# Patient Record
Sex: MALE | Race: WHITE
[De-identification: names, ages, dates, MRNs, and addresses within clinical notes are randomized per-mention and may not be internally consistent; named-entity substitution may affect disease eponyms.]

---

## 2020-03-23 ENCOUNTER — TRANSCRIPTION ENCOUNTER (OUTPATIENT)
Age: 30
End: 2020-03-23

## 2020-07-06 PROBLEM — Z00.00 ENCOUNTER FOR PREVENTIVE HEALTH EXAMINATION: Status: ACTIVE | Noted: 2020-07-06

## 2020-07-14 ENCOUNTER — APPOINTMENT (OUTPATIENT)
Dept: ORTHOPEDIC SURGERY | Facility: CLINIC | Age: 30
End: 2020-07-14
Payer: COMMERCIAL

## 2020-07-14 VITALS — TEMPERATURE: 98.1 F

## 2020-07-14 VITALS
BODY MASS INDEX: 24.38 KG/M2 | DIASTOLIC BLOOD PRESSURE: 73 MMHG | HEART RATE: 89 BPM | WEIGHT: 190 LBS | HEIGHT: 74 IN | SYSTOLIC BLOOD PRESSURE: 137 MMHG

## 2020-07-14 DIAGNOSIS — Z78.9 OTHER SPECIFIED HEALTH STATUS: ICD-10-CM

## 2020-07-14 PROCEDURE — 72070 X-RAY EXAM THORAC SPINE 2VWS: CPT

## 2020-07-14 PROCEDURE — 99204 OFFICE O/P NEW MOD 45 MIN: CPT

## 2020-07-14 NOTE — PHYSICAL EXAM
[Normal] : Alert and in no acute distress [Poor Appearance] : well-appearing [Acute Distress] : not in acute distress [Obese] : not obese [de-identified] : CONSTITUTIONAL: The patient is a very pleasant individual who is well-nourished and who appears stated age. 2 cm head laceration with staples noted. Staples were removed in office today. Wound is C/D/I. \par PSYCHIATRIC: The patient is alert and oriented X 3 and in no apparent distress, and participates with orthopedic evaluation well.\par HEAD: Atraumatic and is nonsyndromic in appearance.\par EENT: No visible thyromegaly, EOMI.\par RESPIRATORY: Respiratory rate is regular, not dyspneic on examination.\par LYMPHATICS: There is no inguinal lymphadenopathy\par INTEGUMENTARY: Skin is clean, dry, and intact about the bilateral lower extremities and lumbar spine.\par VASCULAR: There is brisk capillary refill about the bilateral lower extremities.\par NEUROLOGIC: There are no pathologic reflexes. There is no decrease in sensation of the bilateral lower extremities on Wartenberg pinwheel examination. Deep tendon reflexes are well maintained at 2+/4 of the bilateral lower extremities and are symmetric..\par MUSCULOSKELETAL: There is no visible muscular atrophy. Manual motor strength is well maintained in the bilateral lower extremities. Range of motion of lumbar spine is well maintained. The patient ambulates in a non-myelopathic manner. Negative tension sign and straight leg raise bilaterally. Quad extension, ankle dorsiflexion, EHL, plantar flexion, and ankle eversion are well preserved. Normal secondary orthopaedic exam of bilateral hips, greater trochanteric area, knees and ankles \par \par  [de-identified] : cervical MRI done at [facility] 7/1/2020: multilevel DDD greatest at C5-C6 with moderate spinal stenosis, C5-C6 minimal right disc protrusion, C4-C5 foraminal disc protrusion on the left. \par \par thoracic CT done at [facility] 7/1/2020: stable T11 compression fracture\par \par cervical CT done at [facility] 7/1/2020: no fracture. \par \par thoracic and lumbar MRI done at [facility] 7/1/2020: mild superior T10 and T11 endplate fractures, diffuse degenerative disc disease of the lumbar spine, no cord signal abnormality. \par \par

## 2020-07-14 NOTE — ADDENDUM
[FreeTextEntry1] : Documented by Paige Root acting as a scribe for LETITIA Andrew. on 07/14/2020. \par \par All medical record entries made by the Scribe were at my, LETITIA Andrew, direction and personally dictated by me on 07/14/2020. I have reviewed the chart and agree that the record accurately reflects my personal performance of the history, physical exam, assessment and plan. I have also personally directed, reviewed, and agreed with the chart.

## 2020-07-14 NOTE — HISTORY OF PRESENT ILLNESS
[de-identified] : 29 year old male presents for evaluation of mid back pain follow a fall from a ladder on 7/1/2020. Patient was on an extension ladder when the tarp underneath it slipped out, causing the ladder to fall. Patient states he fell with the ladder and landed directly on his back. Patient states he also suffered a laceration to his head with this fall. Patient went to [facility] where advanced imaging was done of his head as well as of his cervical, thoracic, and lumbar spine. This imaging revealed stable T10 and T11 fractures with no cord signal change. He was discharged with Percocet and Robaxin, and placed in a cervical and lumbar stabilizing brace. He has since been ambulating with a cane for stability. He locates pain directly over T10 and T11, and radiating muscular pain around his bilateral sides. He describes an increase in pain with twisting but denies weakness, numbness, or tingling. He has been using minimal pain medication.  [Incontinence] : no incontinence [Ataxia] : no ataxia [Urinary Ret.] : no urinary retention [Loss of Dexterity] : good dexterity

## 2020-07-14 NOTE — DISCUSSION/SUMMARY
[de-identified] : We talked about the nature of the condition and treatment options. Patient has a stable T10 and T11 compression fractures following his fall. I reviewed x-ray films with them. At this time I have recommended rest and observation. He was recommended to avoid any lifting or repetitive bending activities. He may take ibuprofen and Tylenol for pain, as well as methocarbamol. He may discontinue use of the cervical and lumbar brace to his comfort level. 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 He may follow up in 4 weeks for repeat clinical evaluation at which point we will discuss full time return to work. \par \par He did have a head laceration, and his staples were removed today in office.

## 2020-08-10 DIAGNOSIS — M47.812 SPONDYLOSIS W/OUT MYELOPATHY OR RADICULOPATHY, CERVICAL REGION: ICD-10-CM

## 2020-08-11 ENCOUNTER — APPOINTMENT (OUTPATIENT)
Dept: ORTHOPEDIC SURGERY | Facility: CLINIC | Age: 30
End: 2020-08-11
Payer: COMMERCIAL

## 2020-08-11 VITALS
BODY MASS INDEX: 24.38 KG/M2 | DIASTOLIC BLOOD PRESSURE: 65 MMHG | HEIGHT: 74 IN | SYSTOLIC BLOOD PRESSURE: 123 MMHG | HEART RATE: 69 BPM | WEIGHT: 190 LBS

## 2020-08-11 VITALS — TEMPERATURE: 97.3 F

## 2020-08-11 DIAGNOSIS — S22.070A WEDGE COMPRESSION FRACTURE OF T9-T10 VERTEBRA, INITIAL ENCOUNTER FOR CLOSED FRACTURE: ICD-10-CM

## 2020-08-11 DIAGNOSIS — S22.080A WEDGE COMPRESSION FRACTURE OF T11-T12 VERTEBRA, INITIAL ENCOUNTER FOR CLOSED FRACTURE: ICD-10-CM

## 2020-08-11 PROCEDURE — 72040 X-RAY EXAM NECK SPINE 2-3 VW: CPT

## 2020-08-11 PROCEDURE — 72070 X-RAY EXAM THORAC SPINE 2VWS: CPT

## 2020-08-11 PROCEDURE — 99214 OFFICE O/P EST MOD 30 MIN: CPT

## 2020-08-11 NOTE — PHYSICAL EXAM
[de-identified] : CONSTITUTIONAL: Patient is a very pleasant individual who is well-nourished and appears stated age. \par PSYCHIATRIC: Alert and oriented times three and in no apparent distress, and participates with orthopedic evaluation well.\par HEAD: Atraumatic and nonsyndromic in appearance.\par EENT: No thyromegaly, EOMI.\par RESPIRATORY: Respiratory rate is regular, not dyspneic on examination.\par LYMPHATICS: There is no cervical or axillary lymphadenopathy.\par INTEGUMENTARY: Skin is clean, dry, and intact about the bilateral upper extremities and cervical spine. \par VASCULAR: There is brisk capillary refill about the bilateral upper extremities and radial pulses are 2/4. \par NEUROLOGIC: Negative L'hirmitte, negative Spurling’s sign. There are no pathologic reflexes. There is no decrease in sensation of the bilateral upper extremities on Wartenberg pinwheel examination. Deep tendon reflexes are well-maintained at +2/4 of the bilateral upper extremities and are symmetric.\par MUSCULOSKELETAL: There is no visible muscular atrophy. Manual motor strength is well maintained in the bilateral upper extremities. Cervical range of motion is well maintained. The patient ambulates in a non-myelopathic manner. Normal secondary orthopaedic exam of bilateral shoulders, elbows and hands. Elbow flexion and extension, wrist extension, finger flexion and abduction are well maintained. \par Mild mechanically oriented thoracic back pain.  [de-identified] : Xray of the thoracic spine taken today shows compression fracture at T10 and T11 and loss thoracic kyphosis. \par \par Xray of the cervical spine taken today shows lose of cervical lordosis.

## 2020-08-11 NOTE — ADDENDUM
[FreeTextEntry1] : Documented by Herman Lagos acting as a scribe for Dr. Gil Hansen on 08/11/2020. All medical record entries made by the Scribe were at my, Dr. Gil Hansen, direction and personally dictated by me on 08/11/2020 . I have reviewed the chart and agree that the record accurately reflects my personal performance of the history, physical exam, assessment and plan. I have also personally directed, reviewed, and agreed with the chart.

## 2020-08-11 NOTE — REVIEW OF SYSTEMS
[Joint Pain] : joint pain [Negative] : Heme/Lymph [Fever] : no fever [SOB on Exertion] : no shortness of breath on exertion [Cough] : no cough [Chills] : no chills

## 2020-08-11 NOTE — DISCUSSION/SUMMARY
[de-identified] : Patient to follow up in two months to monitor his thoracic compression fractures.

## 2020-08-11 NOTE — HISTORY OF PRESENT ILLNESS
[Ataxia] : no ataxia [de-identified] : 29 year M Presents with compression fractures in T10 and T11 sustained on july 1st 2020. Pt gradually improving and is slowly returning to exercise and working.  [Incontinence] : no incontinence [Loss of Dexterity] : good dexterity [Urinary Ret.] : no urinary retention

## 2020-10-13 ENCOUNTER — APPOINTMENT (OUTPATIENT)
Dept: ORTHOPEDIC SURGERY | Facility: CLINIC | Age: 30
End: 2020-10-13

## 2020-11-09 ENCOUNTER — TRANSCRIPTION ENCOUNTER (OUTPATIENT)
Age: 30
End: 2020-11-09

## 2020-11-16 ENCOUNTER — TRANSCRIPTION ENCOUNTER (OUTPATIENT)
Age: 30
End: 2020-11-16

## 2022-01-24 ENCOUNTER — TRANSCRIPTION ENCOUNTER (OUTPATIENT)
Age: 32
End: 2022-01-24

## 2022-02-09 ENCOUNTER — TRANSCRIPTION ENCOUNTER (OUTPATIENT)
Age: 32
End: 2022-02-09

## 2023-09-24 ENCOUNTER — NON-APPOINTMENT (OUTPATIENT)
Age: 33
End: 2023-09-24

## 2025-06-14 ENCOUNTER — INPATIENT (INPATIENT)
Facility: HOSPITAL | Age: 35
LOS: 1 days | Discharge: HOME | DRG: 914 | End: 2025-06-16
Attending: STUDENT IN AN ORGANIZED HEALTH CARE EDUCATION/TRAINING PROGRAM | Admitting: STUDENT IN AN ORGANIZED HEALTH CARE EDUCATION/TRAINING PROGRAM
Payer: SELF-PAY

## 2025-06-14 VITALS — HEART RATE: 46 BPM | DIASTOLIC BLOOD PRESSURE: 100 MMHG | SYSTOLIC BLOOD PRESSURE: 146 MMHG

## 2025-06-14 DIAGNOSIS — S09.90XA UNSPECIFIED INJURY OF HEAD, INITIAL ENCOUNTER: ICD-10-CM

## 2025-06-14 LAB
ABO RH CONFIRMATION: SIGNIFICANT CHANGE UP
ALBUMIN SERPL ELPH-MCNC: 4.1 G/DL — SIGNIFICANT CHANGE UP (ref 3.3–5.2)
ALP SERPL-CCNC: 35 U/L — LOW (ref 40–120)
ALT FLD-CCNC: 18 U/L — SIGNIFICANT CHANGE UP
AMPHET UR-MCNC: NEGATIVE — SIGNIFICANT CHANGE UP
ANION GAP SERPL CALC-SCNC: 12 MMOL/L — SIGNIFICANT CHANGE UP (ref 5–17)
APPEARANCE UR: CLEAR — SIGNIFICANT CHANGE UP
APTT BLD: 24.9 SEC — LOW (ref 26.1–36.8)
AST SERPL-CCNC: 28 U/L — SIGNIFICANT CHANGE UP
BACTERIA # UR AUTO: NEGATIVE /HPF — SIGNIFICANT CHANGE UP
BARBITURATES UR SCN-MCNC: NEGATIVE — SIGNIFICANT CHANGE UP
BASOPHILS # BLD AUTO: 0.04 K/UL — SIGNIFICANT CHANGE UP (ref 0–0.2)
BASOPHILS NFR BLD AUTO: 0.2 % — SIGNIFICANT CHANGE UP (ref 0–2)
BENZODIAZ UR-MCNC: NEGATIVE — SIGNIFICANT CHANGE UP
BILIRUB SERPL-MCNC: 0.3 MG/DL — LOW (ref 0.4–2)
BILIRUB UR-MCNC: NEGATIVE — SIGNIFICANT CHANGE UP
BLD GP AB SCN SERPL QL: SIGNIFICANT CHANGE UP
BUN SERPL-MCNC: 17.4 MG/DL — SIGNIFICANT CHANGE UP (ref 8–20)
CALCIUM SERPL-MCNC: 8.5 MG/DL — SIGNIFICANT CHANGE UP (ref 8.4–10.5)
CAST: 7 /LPF — HIGH (ref 0–4)
CHLORIDE SERPL-SCNC: 105 MMOL/L — SIGNIFICANT CHANGE UP (ref 96–108)
CK MB CFR SERPL CALC: 5.8 NG/ML — SIGNIFICANT CHANGE UP (ref 0–6.7)
CK SERPL-CCNC: 339 U/L — HIGH (ref 30–200)
CO2 SERPL-SCNC: 21 MMOL/L — LOW (ref 22–29)
COCAINE METAB.OTHER UR-MCNC: POSITIVE
COLOR SPEC: YELLOW — SIGNIFICANT CHANGE UP
CREAT SERPL-MCNC: 0.74 MG/DL — SIGNIFICANT CHANGE UP (ref 0.5–1.3)
DIFF PNL FLD: ABNORMAL
EGFR: 117 ML/MIN/1.73M2 — SIGNIFICANT CHANGE UP
EGFR: 117 ML/MIN/1.73M2 — SIGNIFICANT CHANGE UP
EOSINOPHIL # BLD AUTO: 0.04 K/UL — SIGNIFICANT CHANGE UP (ref 0–0.5)
EOSINOPHIL NFR BLD AUTO: 0.2 % — SIGNIFICANT CHANGE UP (ref 0–6)
ETHANOL SERPL-MCNC: <10 MG/DL — SIGNIFICANT CHANGE UP (ref 0–9)
FENTANYL UR QL SCN: NEGATIVE — SIGNIFICANT CHANGE UP
GAS PNL BLDA: SIGNIFICANT CHANGE UP
GLUCOSE SERPL-MCNC: 142 MG/DL — HIGH (ref 70–99)
GLUCOSE UR QL: NEGATIVE MG/DL — SIGNIFICANT CHANGE UP
HCT VFR BLD CALC: 39.6 % — SIGNIFICANT CHANGE UP (ref 39–50)
HGB BLD-MCNC: 12.9 G/DL — LOW (ref 13–17)
IMM GRANULOCYTES # BLD AUTO: 0.14 K/UL — HIGH (ref 0–0.07)
IMM GRANULOCYTES NFR BLD AUTO: 0.7 % — SIGNIFICANT CHANGE UP (ref 0–0.9)
INR BLD: 1.2 RATIO — HIGH (ref 0.85–1.16)
KETONES UR QL: ABNORMAL MG/DL
LEUKOCYTE ESTERASE UR-ACNC: NEGATIVE — SIGNIFICANT CHANGE UP
LIDOCAIN IGE QN: 24 U/L — SIGNIFICANT CHANGE UP (ref 22–51)
LYMPHOCYTES # BLD AUTO: 1.17 K/UL — SIGNIFICANT CHANGE UP (ref 1–3.3)
LYMPHOCYTES NFR BLD AUTO: 6.2 % — LOW (ref 13–44)
MCHC RBC-ENTMCNC: 29.5 PG — SIGNIFICANT CHANGE UP (ref 27–34)
MCHC RBC-ENTMCNC: 32.6 G/DL — SIGNIFICANT CHANGE UP (ref 32–36)
MCV RBC AUTO: 90.4 FL — SIGNIFICANT CHANGE UP (ref 80–100)
METHADONE UR-MCNC: NEGATIVE — SIGNIFICANT CHANGE UP
MONOCYTES # BLD AUTO: 1.08 K/UL — HIGH (ref 0–0.9)
MONOCYTES NFR BLD AUTO: 5.7 % — SIGNIFICANT CHANGE UP (ref 2–14)
MRSA PCR RESULT.: SIGNIFICANT CHANGE UP
NEUTROPHILS # BLD AUTO: 16.53 K/UL — HIGH (ref 1.8–7.4)
NEUTROPHILS NFR BLD AUTO: 87 % — HIGH (ref 43–77)
NITRITE UR-MCNC: NEGATIVE — SIGNIFICANT CHANGE UP
NRBC # BLD AUTO: 0 K/UL — SIGNIFICANT CHANGE UP (ref 0–0)
NRBC # FLD: 0 K/UL — SIGNIFICANT CHANGE UP (ref 0–0)
NRBC BLD AUTO-RTO: 0 /100 WBCS — SIGNIFICANT CHANGE UP (ref 0–0)
OPIATES UR-MCNC: NEGATIVE — SIGNIFICANT CHANGE UP
PCP SPEC-MCNC: SIGNIFICANT CHANGE UP
PCP UR-MCNC: NEGATIVE — SIGNIFICANT CHANGE UP
PH UR: 5.5 — SIGNIFICANT CHANGE UP (ref 5–8)
PLATELET # BLD AUTO: 192 K/UL — SIGNIFICANT CHANGE UP (ref 150–400)
PMV BLD: 10.7 FL — SIGNIFICANT CHANGE UP (ref 7–13)
POTASSIUM SERPL-MCNC: 3.5 MMOL/L — SIGNIFICANT CHANGE UP (ref 3.5–5.3)
POTASSIUM SERPL-SCNC: 3.5 MMOL/L — SIGNIFICANT CHANGE UP (ref 3.5–5.3)
PROT SERPL-MCNC: 6.2 G/DL — LOW (ref 6.6–8.7)
PROT UR-MCNC: 30 MG/DL
PROTHROM AB SERPL-ACNC: 13.5 SEC — HIGH (ref 9.9–13.4)
RBC # BLD: 4.38 M/UL — SIGNIFICANT CHANGE UP (ref 4.2–5.8)
RBC # FLD: 12.2 % — SIGNIFICANT CHANGE UP (ref 10.3–14.5)
RBC CASTS # UR COMP ASSIST: 0 /HPF — SIGNIFICANT CHANGE UP (ref 0–4)
S AUREUS DNA NOSE QL NAA+PROBE: SIGNIFICANT CHANGE UP
SODIUM SERPL-SCNC: 138 MMOL/L — SIGNIFICANT CHANGE UP (ref 135–145)
SP GR SPEC: >1.03 — HIGH (ref 1–1.03)
SQUAMOUS # UR AUTO: 1 /HPF — SIGNIFICANT CHANGE UP (ref 0–5)
THC UR QL: POSITIVE
TROPONIN T, HIGH SENSITIVITY RESULT: <6 NG/L — SIGNIFICANT CHANGE UP (ref 0–51)
UROBILINOGEN FLD QL: 1 MG/DL — SIGNIFICANT CHANGE UP (ref 0.2–1)
WBC # BLD: 19 K/UL — HIGH (ref 3.8–10.5)
WBC # FLD AUTO: 19 K/UL — HIGH (ref 3.8–10.5)
WBC UR QL: 3 /HPF — SIGNIFICANT CHANGE UP (ref 0–5)

## 2025-06-14 RX ORDER — LEVETIRACETAM 10 MG/ML
500 INJECTION, SOLUTION INTRAVENOUS EVERY 12 HOURS
Refills: 0 | Status: DISCONTINUED | OUTPATIENT
Start: 2025-06-14 | End: 2025-06-15

## 2025-06-14 RX ORDER — ONDANSETRON HCL/PF 4 MG/2 ML
4 VIAL (ML) INJECTION ONCE
Refills: 0 | Status: COMPLETED | OUTPATIENT
Start: 2025-06-14 | End: 2025-06-14

## 2025-06-14 RX ORDER — CEFAZOLIN SODIUM IN 0.9 % NACL 3 G/100 ML
2000 INTRAVENOUS SOLUTION, PIGGYBACK (ML) INTRAVENOUS ONCE
Refills: 0 | Status: COMPLETED | OUTPATIENT
Start: 2025-06-14 | End: 2025-06-14

## 2025-06-14 RX ORDER — MIDAZOLAM IN 0.9 % SOD.CHLORID 1 MG/ML
4 PLASTIC BAG, INJECTION (ML) INTRAVENOUS ONCE
Refills: 0 | Status: DISCONTINUED | OUTPATIENT
Start: 2025-06-14 | End: 2025-06-14

## 2025-06-14 RX ORDER — OXYCODONE HYDROCHLORIDE 30 MG/1
5 TABLET ORAL EVERY 4 HOURS
Refills: 0 | Status: DISCONTINUED | OUTPATIENT
Start: 2025-06-14 | End: 2025-06-15

## 2025-06-14 RX ORDER — CEFAZOLIN SODIUM IN 0.9 % NACL 3 G/100 ML
2000 INTRAVENOUS SOLUTION, PIGGYBACK (ML) INTRAVENOUS ONCE
Refills: 0 | Status: DISCONTINUED | OUTPATIENT
Start: 2025-06-14 | End: 2025-06-14

## 2025-06-14 RX ORDER — HYDROMORPHONE/SOD CHLOR,ISO/PF 2 MG/10 ML
0.5 SYRINGE (ML) INJECTION EVERY 4 HOURS
Refills: 0 | Status: DISCONTINUED | OUTPATIENT
Start: 2025-06-14 | End: 2025-06-15

## 2025-06-14 RX ORDER — OXYCODONE HYDROCHLORIDE 30 MG/1
10 TABLET ORAL EVERY 4 HOURS
Refills: 0 | Status: DISCONTINUED | OUTPATIENT
Start: 2025-06-14 | End: 2025-06-15

## 2025-06-14 RX ORDER — ACETAMINOPHEN 500 MG/5ML
1000 LIQUID (ML) ORAL EVERY 6 HOURS
Refills: 0 | Status: DISCONTINUED | OUTPATIENT
Start: 2025-06-14 | End: 2025-06-14

## 2025-06-14 RX ORDER — SENNA 187 MG
2 TABLET ORAL AT BEDTIME
Refills: 0 | Status: DISCONTINUED | OUTPATIENT
Start: 2025-06-14 | End: 2025-06-16

## 2025-06-14 RX ORDER — POLYETHYLENE GLYCOL 3350 17 G/17G
17 POWDER, FOR SOLUTION ORAL DAILY
Refills: 0 | Status: DISCONTINUED | OUTPATIENT
Start: 2025-06-14 | End: 2025-06-16

## 2025-06-14 RX ORDER — DEXMEDETOMIDINE HYDROCHLORIDE IN SODIUM CHLORIDE 4 UG/ML
0.5 INJECTION INTRAVENOUS
Qty: 400 | Refills: 0 | Status: DISCONTINUED | OUTPATIENT
Start: 2025-06-14 | End: 2025-06-14

## 2025-06-14 RX ORDER — SODIUM CHLORIDE 9 G/1000ML
1000 INJECTION, SOLUTION INTRAVENOUS
Refills: 0 | Status: DISCONTINUED | OUTPATIENT
Start: 2025-06-14 | End: 2025-06-15

## 2025-06-14 RX ORDER — BACITRACIN 500 UNIT/G
1 OINTMENT (GRAM) TOPICAL DAILY
Refills: 0 | Status: DISCONTINUED | OUTPATIENT
Start: 2025-06-14 | End: 2025-06-16

## 2025-06-14 RX ORDER — FENTANYL CITRATE-0.9 % NACL/PF 100MCG/2ML
1 SYRINGE (ML) INTRAVENOUS
Qty: 2500 | Refills: 0 | Status: DISCONTINUED | OUTPATIENT
Start: 2025-06-14 | End: 2025-06-14

## 2025-06-14 RX ORDER — SODIUM CHLORIDE 9 G/1000ML
1000 INJECTION, SOLUTION INTRAVENOUS ONCE
Refills: 0 | Status: COMPLETED | OUTPATIENT
Start: 2025-06-14 | End: 2025-06-14

## 2025-06-14 RX ORDER — PROPOFOL 10 MG/ML
20 INJECTION, EMULSION INTRAVENOUS
Qty: 1000 | Refills: 0 | Status: DISCONTINUED | OUTPATIENT
Start: 2025-06-14 | End: 2025-06-14

## 2025-06-14 RX ORDER — ACETAMINOPHEN 500 MG/5ML
1000 LIQUID (ML) ORAL EVERY 6 HOURS
Refills: 0 | Status: DISCONTINUED | OUTPATIENT
Start: 2025-06-14 | End: 2025-06-15

## 2025-06-14 RX ORDER — FENTANYL CITRATE-0.9 % NACL/PF 100MCG/2ML
50 SYRINGE (ML) INTRAVENOUS ONCE
Refills: 0 | Status: DISCONTINUED | OUTPATIENT
Start: 2025-06-14 | End: 2025-06-14

## 2025-06-14 RX ORDER — DIAZEPAM 5 MG/1
5 TABLET ORAL ONCE
Refills: 0 | Status: DISCONTINUED | OUTPATIENT
Start: 2025-06-14 | End: 2025-06-14

## 2025-06-14 RX ADMIN — PROPOFOL 9 MICROGRAM(S)/KG/MIN: 10 INJECTION, EMULSION INTRAVENOUS at 08:05

## 2025-06-14 RX ADMIN — Medication 2000 MILLIGRAM(S): at 11:39

## 2025-06-14 RX ADMIN — DIAZEPAM 5 MILLIGRAM(S): 5 TABLET ORAL at 21:37

## 2025-06-14 RX ADMIN — Medication 2000 MILLIGRAM(S): at 06:50

## 2025-06-14 RX ADMIN — Medication 4 MILLIGRAM(S): at 21:36

## 2025-06-14 RX ADMIN — Medication 50 MICROGRAM(S): at 08:05

## 2025-06-14 RX ADMIN — PROPOFOL 9 MICROGRAM(S)/KG/MIN: 10 INJECTION, EMULSION INTRAVENOUS at 16:16

## 2025-06-14 RX ADMIN — Medication 1000 MILLIGRAM(S): at 22:31

## 2025-06-14 RX ADMIN — PROPOFOL 9 MICROGRAM(S)/KG/MIN: 10 INJECTION, EMULSION INTRAVENOUS at 10:00

## 2025-06-14 RX ADMIN — SODIUM CHLORIDE 1000 MILLILITER(S): 9 INJECTION, SOLUTION INTRAVENOUS at 18:52

## 2025-06-14 RX ADMIN — Medication 4 MILLIGRAM(S): at 14:00

## 2025-06-14 RX ADMIN — Medication 1000 MILLIGRAM(S): at 17:00

## 2025-06-14 RX ADMIN — PROPOFOL 9 MICROGRAM(S)/KG/MIN: 10 INJECTION, EMULSION INTRAVENOUS at 19:36

## 2025-06-14 RX ADMIN — PROPOFOL 9 MICROGRAM(S)/KG/MIN: 10 INJECTION, EMULSION INTRAVENOUS at 12:44

## 2025-06-14 RX ADMIN — Medication 400 MILLIGRAM(S): at 21:51

## 2025-06-14 RX ADMIN — Medication 15 MILLILITER(S): at 17:49

## 2025-06-14 RX ADMIN — Medication 400 MILLIGRAM(S): at 16:51

## 2025-06-14 RX ADMIN — Medication 1 APPLICATION(S): at 11:39

## 2025-06-14 RX ADMIN — OXYCODONE HYDROCHLORIDE 5 MILLIGRAM(S): 30 TABLET ORAL at 23:14

## 2025-06-14 RX ADMIN — LEVETIRACETAM 500 MILLIGRAM(S): 10 INJECTION, SOLUTION INTRAVENOUS at 17:48

## 2025-06-14 RX ADMIN — Medication 50 MICROGRAM(S): at 08:39

## 2025-06-14 RX ADMIN — Medication 7.5 MICROGRAM(S)/KG/HR: at 08:05

## 2025-06-14 RX ADMIN — OXYCODONE HYDROCHLORIDE 5 MILLIGRAM(S): 30 TABLET ORAL at 22:45

## 2025-06-14 RX ADMIN — DEXMEDETOMIDINE HYDROCHLORIDE IN SODIUM CHLORIDE 10.8 MICROGRAM(S)/KG/HR: 4 INJECTION INTRAVENOUS at 17:48
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
## 2025-06-14 NOTE — PROCEDURE NOTE - NSLAC3WOUNDCLOSE_SKIN_A_CORE
Additional Notes: Patient consent was obtained to proceed with the visit and recommended plan of care after discussion of all risks and benefits, including the risks of COVID-19 exposure.\\n
Detail Level: Simple
Render Risk Assessment In Note?: no
skin suture

## 2025-06-14 NOTE — PATIENT PROFILE ADULT - FALL HARM RISK - HARM RISK INTERVENTIONS
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
## 2025-06-14 NOTE — CHART NOTE - NSCHARTNOTEFT_GEN_A_CORE
Received phone call from [facility] who advised that they are aware of the EEG order for the patient; however, there are no current EEG machines available in the hospital and all 10 are currently being used. I advised that patient needs EEG placed at earliest convenience for high clinical suspicion via pre-hospital report for seizure like activity and unknown PMHx of the patient given critical alias. EEG tech advised that Dr. Jameson is understanding of the situation and will try to assess for the other 9 patients currently on EEG if any of them can come off so this patient can be started. Cerebel not indicated at this time due to no evidence of Status Epilepticus at this time.
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
## 2025-06-14 NOTE — H&P ADULT - NSHPPHYSICALEXAM_GEN_ALL_CORE
Primary Survey:  A: Intact (intubated)  B: Breath sounds equal b/l with symmetric rise and fall of the chest  C: Central and peripheral pulses intact b/l  D: Pupils 2mm and non reactive b/l, GCS 3  E: Patient fully exposed for exam and then covered with warm blankets  CXR: ETT in good position, no hemo/pneumothorax identified  Pelvic XRay: No open book deformity    HEENT: ~4cm laceration of the left brow, ~2cm laceration of the lower lip, blood noted in the b/l nares  Neck: Trachea midline, no swelling, no JVD, cervical collar in place  Pulm: CTAB, equal air entry, EtCO2 38  Cardiac: S1S2  Back: Without palpable stepoff or other deformity  GI: Soft, nondistended, no ecchymosis, pelvis stable  Vascular: + femoral, radial, and DP pulses b/l  Neuro: Unresponsive  MSK: No chest wall deformity, no noted areas of deformity  Skin: b/l knee abrasions

## 2025-06-14 NOTE — CHART NOTE - NSCHARTNOTEFT_GEN_A_CORE
6/14 Repeat 6hr CTH read as stable and reviewed by attending. No acute neurosurgerical intervention needed at this time. Neurosurgery signing off, reconsult prn. If patient remains with symptoms, can follow up with Dr. Quinteros in outpatient office. Further medical management per primary team. Discussed with Dr. Quinteros.

## 2025-06-14 NOTE — PROCEDURE NOTE - ADDITIONAL PROCEDURE DETAILS
L eyebrow laceration (4 cm): several 5-0 chromic sutures placed + two 4-0 vicryl sutures  L eyebrow laceration (2 cm): three 5-0 chromic sutures placed  Lower lip laceration: L eyebrow laceration (4 cm): several 5-0 chromic sutures + two 4-0 vicryl sutures placed  L eyebrow laceration (2 cm): three 5-0 chromic sutures placed  Lower lip laceration (skin only, not on oral mucosa or inside the mouth): multiple 4-0 chromic sutures placed

## 2025-06-14 NOTE — ED PROVIDER NOTE - ATTENDING CONTRIBUTION TO CARE
Serjio ATTG See MDM I performed a history and physical exam of the patient and discussed their management with the Physician assistant reviewed the PAs note and agree with the documented findings and plan of care. My medical decision making and observations are found above.
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
## 2025-06-14 NOTE — ED ADULT NURSE NOTE - PAIN RATING/NUMBER SCALE (0-10): ACTIVITY
Impression: Combined forms of age-related cataract, bilateral: H25.813. Plan:  Discussed cataracts with patient. Discussed treatment options. Surgical treatment is recommended. Surgical risks and benefits discussed. Patient elects surgical treatment. Recommend surgery OU, OS first. Patient is candidate/interested in multifocal, toric, standard, LenSx and ORA. Aim OS: plano. Aim OD: plano. Patient will need glasses for full time wear.  Outcome of surgery limitations include:  Puckering of macula, bilateral, Dry eye syndrome of bilateral lacrimal glands, 0 (no pain/absence of nonverbal indicators of pain)

## 2025-06-14 NOTE — ED PROVIDER NOTE - CLINICAL SUMMARY MEDICAL DECISION MAKING FREE TEXT BOX
Serjio ATTG   Mid 39-year-old male chief complaint altered mental status after suspected fall.  Patient came in as a level a trauma intubated in the field  Patient received rocuronium and ketamine in the field    GENERAL: intubated   HEENT:  lac to left eye brow   LUNGS: decreased breath sounds right lung   CARDIAC: RRR, no m/r/g  ABDOMEN: Soft, , non tender, non distended, no rebound, no guarding  BACK: No midline spinal tenderness, no CVA tenderness, no deformities.  NEURO:  not moving   SKIN: Warm and dry. No rash.  PSYCH: unable to assess    given hx and pe Patient suspected brain bleed plan for admission to sicu

## 2025-06-14 NOTE — CONSULT NOTE ADULT - ASSESSMENT
Assessment: Pt appears to be in his thirties with unknown PMHx who was found down. EMS reports that he was unresponsive and therefore intubated in the field. He was given 100mg of IV ketamine and 100mg of IV rocuronium for intubation at 6;08am. He was re-dosed the mary with another 100mg at 6:25am.  Neurosurgery consulted in light of CT head findings from imaging performed on 6/14/2025 demonstrating "0.3 cm acute subdural hematoma as well as small volume subarachnoid hemorrhage along the left anterior frontal lobe, as well as possible punctate intraparenchymal hemorrhage in the left anterior frontal lobe".       Plan- per Dr. Quinteros Assessment: Pt appears to be in his thirties with unknown PMHx who was found down. EMS reports that he was unresponsive and therefore intubated in the field. He was given 100mg of IV ketamine and 100mg of IV rocuronium for intubation at 6;08am. He was re-dosed the mary with another 100mg at 6:25am.  Neurosurgery consulted in light of CT head findings from imaging performed on 6/14/2025 demonstrating "0.3 cm acute subdural hematoma as well as small volume subarachnoid hemorrhage along the left anterior frontal lobe, as well as possible punctate intraparenchymal hemorrhage in the left anterior frontal lobe".       Plan- per Dr. Quinteros  - Imaging and case reviewed by Dr. Quinteros  - Q1 neuro checks  - Recommend repeat CT head in 6 hours from first scan for further assessment  - HOLD any anticoagulation/antiplatelet medication in setting of SAH/SDH  - Pain control PRN, avoid oversedation  - Further medical management per primary team  - Neurosurgery will continue to follow

## 2025-06-14 NOTE — H&P ADULT - BIRTH SEX
Bacterial Conjunctivitis    You have an infection in the membranes covering the white part of the eye. This part of the eye is called the conjunctiva. The infection is called conjunctivitis. The most common symptoms of conjunctivitis include a thick, pus-like discharge from the eye, swollen eyelids, redness, eyelids sticking together upon awakening, and a gritty or scratchy feeling in the eye. Your infection was caused by bacteria. It may be treated with medicine. With treatment, the infection takes about 7 to 10 days to resolve.   Home care  Use prescribed antibiotic eye drops or ointment as directed to treat the infection.  Apply a warm compress (towel soaked in warm water) to the affected eye 3 to 4 times a day. Do this just before applying medicine to the eye.  Use a warm, wet cloth to wipe away crusting of the eyelids in the morning. This is caused by mucus drainage during the night. You may also use saline irrigating solution or artificial tears to rinse away mucus in the eye. Do not put a patch over the eye.  Wash your hands before and after touching the infected eye. This is to prevent spreading the infection to the other eye, and to other people. Don't share your towels or washcloths with others.  You may use acetaminophen or ibuprofen to control pain, unless another medicine was prescribed. Talk with your healthcare provider before using these medicines if you have chronic liver or kidney disease. Also talk with your provider if you have ever had a stomach ulcer or digestive bleeding.  Don't wear contact lenses until your eyes have healed and all symptoms are gone.    Follow-up care  Follow up with your healthcare provider, or as advised.  When to seek medical advice  Call your healthcare provider right away if any of these occur:  Worsening vision  Increasing pain in the eye  Increasing swelling or redness of the eyelid  Redness spreading around the eye  Karla last reviewed this educational content  on 4/1/2020  © 4006-0667 [employer]. All rights reserved. This information is not intended as a substitute for professional medical care. Always follow your healthcare professional's instructions.         Male

## 2025-06-15 LAB
ANION GAP SERPL CALC-SCNC: 10 MMOL/L — SIGNIFICANT CHANGE UP (ref 5–17)
BUN SERPL-MCNC: 13.1 MG/DL — SIGNIFICANT CHANGE UP (ref 8–20)
CALCIUM SERPL-MCNC: 8.3 MG/DL — LOW (ref 8.4–10.5)
CHLORIDE SERPL-SCNC: 104 MMOL/L — SIGNIFICANT CHANGE UP (ref 96–108)
CO2 SERPL-SCNC: 22 MMOL/L — SIGNIFICANT CHANGE UP (ref 22–29)
CREAT SERPL-MCNC: 0.85 MG/DL — SIGNIFICANT CHANGE UP (ref 0.5–1.3)
EGFR: 113 ML/MIN/1.73M2 — SIGNIFICANT CHANGE UP
EGFR: 113 ML/MIN/1.73M2 — SIGNIFICANT CHANGE UP
GLUCOSE SERPL-MCNC: 87 MG/DL — SIGNIFICANT CHANGE UP (ref 70–99)
HCT VFR BLD CALC: 33.3 % — LOW (ref 39–50)
HGB BLD-MCNC: 11.2 G/DL — LOW (ref 13–17)
HIV 1 & 2 AB SERPL IA.RAPID: SIGNIFICANT CHANGE UP
MAGNESIUM SERPL-MCNC: 1.8 MG/DL — SIGNIFICANT CHANGE UP (ref 1.6–2.6)
MCHC RBC-ENTMCNC: 29.8 PG — SIGNIFICANT CHANGE UP (ref 27–34)
MCHC RBC-ENTMCNC: 33.6 G/DL — SIGNIFICANT CHANGE UP (ref 32–36)
MCV RBC AUTO: 88.6 FL — SIGNIFICANT CHANGE UP (ref 80–100)
NRBC # BLD AUTO: 0 K/UL — SIGNIFICANT CHANGE UP (ref 0–0)
NRBC # FLD: 0 K/UL — SIGNIFICANT CHANGE UP (ref 0–0)
NRBC BLD AUTO-RTO: 0 /100 WBCS — SIGNIFICANT CHANGE UP (ref 0–0)
PHOSPHATE SERPL-MCNC: 3.1 MG/DL — SIGNIFICANT CHANGE UP (ref 2.4–4.7)
PLATELET # BLD AUTO: 180 K/UL — SIGNIFICANT CHANGE UP (ref 150–400)
PMV BLD: 11.1 FL — SIGNIFICANT CHANGE UP (ref 7–13)
POTASSIUM SERPL-MCNC: 4.1 MMOL/L — SIGNIFICANT CHANGE UP (ref 3.5–5.3)
POTASSIUM SERPL-SCNC: 4.1 MMOL/L — SIGNIFICANT CHANGE UP (ref 3.5–5.3)
RBC # BLD: 3.76 M/UL — LOW (ref 4.2–5.8)
RBC # FLD: 12.7 % — SIGNIFICANT CHANGE UP (ref 10.3–14.5)
SODIUM SERPL-SCNC: 136 MMOL/L — SIGNIFICANT CHANGE UP (ref 135–145)
TRIGL SERPL-MCNC: 49 MG/DL — SIGNIFICANT CHANGE UP
WBC # BLD: 14.43 K/UL — HIGH (ref 3.8–10.5)
WBC # FLD AUTO: 14.43 K/UL — HIGH (ref 3.8–10.5)

## 2025-06-15 RX ORDER — ACETAMINOPHEN 500 MG/5ML
1000 LIQUID (ML) ORAL EVERY 6 HOURS
Refills: 0 | Status: DISCONTINUED | OUTPATIENT
Start: 2025-06-15 | End: 2025-06-16

## 2025-06-15 RX ORDER — OXYCODONE HYDROCHLORIDE 30 MG/1
5 TABLET ORAL EVERY 6 HOURS
Refills: 0 | Status: DISCONTINUED | OUTPATIENT
Start: 2025-06-15 | End: 2025-06-16

## 2025-06-15 RX ORDER — ENOXAPARIN SODIUM 100 MG/ML
30 INJECTION SUBCUTANEOUS EVERY 12 HOURS
Refills: 0 | Status: DISCONTINUED | OUTPATIENT
Start: 2025-06-15 | End: 2025-06-16

## 2025-06-15 RX ORDER — MAGNESIUM SULFATE 500 MG/ML
2 SYRINGE (ML) INJECTION ONCE
Refills: 0 | Status: COMPLETED | OUTPATIENT
Start: 2025-06-15 | End: 2025-06-15

## 2025-06-15 RX ORDER — MELATONIN 5 MG
5 TABLET ORAL AT BEDTIME
Refills: 0 | Status: DISCONTINUED | OUTPATIENT
Start: 2025-06-15 | End: 2025-06-16

## 2025-06-15 RX ORDER — OXYCODONE HYDROCHLORIDE 30 MG/1
10 TABLET ORAL EVERY 6 HOURS
Refills: 0 | Status: DISCONTINUED | OUTPATIENT
Start: 2025-06-15 | End: 2025-06-16

## 2025-06-15 RX ORDER — ONDANSETRON HCL/PF 4 MG/2 ML
4 VIAL (ML) INJECTION ONCE
Refills: 0 | Status: COMPLETED | OUTPATIENT
Start: 2025-06-15 | End: 2025-06-15

## 2025-06-15 RX ORDER — HYDROMORPHONE/SOD CHLOR,ISO/PF 2 MG/10 ML
0.5 SYRINGE (ML) INJECTION EVERY 6 HOURS
Refills: 0 | Status: DISCONTINUED | OUTPATIENT
Start: 2025-06-15 | End: 2025-06-16

## 2025-06-15 RX ORDER — FLUTICASONE PROPIONATE 50 UG/1
1 SPRAY, METERED NASAL
Refills: 0 | Status: DISCONTINUED | OUTPATIENT
Start: 2025-06-15 | End: 2025-06-16

## 2025-06-15 RX ADMIN — OXYCODONE HYDROCHLORIDE 10 MILLIGRAM(S): 30 TABLET ORAL at 09:52

## 2025-06-15 RX ADMIN — Medication 4 MILLIGRAM(S): at 14:47

## 2025-06-15 RX ADMIN — Medication 400 MILLIGRAM(S): at 05:30

## 2025-06-15 RX ADMIN — POLYETHYLENE GLYCOL 3350 17 GRAM(S): 17 POWDER, FOR SOLUTION ORAL at 11:14

## 2025-06-15 RX ADMIN — OXYCODONE HYDROCHLORIDE 10 MILLIGRAM(S): 30 TABLET ORAL at 08:52

## 2025-06-15 RX ADMIN — Medication 25 GRAM(S): at 05:30

## 2025-06-15 RX ADMIN — OXYCODONE HYDROCHLORIDE 10 MILLIGRAM(S): 30 TABLET ORAL at 03:30

## 2025-06-15 RX ADMIN — Medication 5 MILLIGRAM(S): at 21:25

## 2025-06-15 RX ADMIN — Medication 0.5 MILLIGRAM(S): at 05:30

## 2025-06-15 RX ADMIN — Medication 0.5 MILLIGRAM(S): at 11:14

## 2025-06-15 RX ADMIN — OXYCODONE HYDROCHLORIDE 10 MILLIGRAM(S): 30 TABLET ORAL at 02:41

## 2025-06-15 RX ADMIN — OXYCODONE HYDROCHLORIDE 10 MILLIGRAM(S): 30 TABLET ORAL at 13:08

## 2025-06-15 RX ADMIN — Medication 1 APPLICATION(S): at 11:15

## 2025-06-15 RX ADMIN — OXYCODONE HYDROCHLORIDE 10 MILLIGRAM(S): 30 TABLET ORAL at 18:43

## 2025-06-15 RX ADMIN — FLUTICASONE PROPIONATE 1 SPRAY(S): 50 SPRAY, METERED NASAL at 17:04

## 2025-06-15 RX ADMIN — Medication 1000 MILLIGRAM(S): at 06:27

## 2025-06-15 RX ADMIN — OXYCODONE HYDROCHLORIDE 10 MILLIGRAM(S): 30 TABLET ORAL at 14:07

## 2025-06-15 RX ADMIN — Medication 0.5 MILLIGRAM(S): at 06:27

## 2025-06-15 RX ADMIN — Medication 0.5 MILLIGRAM(S): at 12:14

## 2025-06-15 RX ADMIN — Medication 1000 MILLIGRAM(S): at 15:46

## 2025-06-15 RX ADMIN — Medication 1 SPRAY(S): at 17:04

## 2025-06-15 RX ADMIN — Medication 40 MILLIGRAM(S): at 11:15

## 2025-06-15 RX ADMIN — Medication 0.5 MILLIGRAM(S): at 02:00

## 2025-06-15 RX ADMIN — Medication 0.5 MILLIGRAM(S): at 01:11

## 2025-06-15 RX ADMIN — LEVETIRACETAM 500 MILLIGRAM(S): 10 INJECTION, SOLUTION INTRAVENOUS at 05:31

## 2025-06-15 RX ADMIN — Medication 1 APPLICATION(S): at 11:19

## 2025-06-15 RX ADMIN — Medication 400 MILLIGRAM(S): at 14:47

## 2025-06-15 NOTE — CONSULT NOTE ADULT - SUBJECTIVE AND OBJECTIVE BOX
Concern for septal hematoma.    Pt seen and examined, minimal air movement through nasal passages, some tenderness to nasal bridge. Fleshy protuberance noted from R nares septum, concerning for hematoma    Further recs pending attending evaluation.    full consult note to follow.      ENT CONSULT     HPI: 39y Male presenting to ED with   Patient denies fevers/chills, denies lightheadedness/dizziness, denies SOB/chest pain, denies nausea/vomiting, denies constipation/diarrhea.  ***    ROS: 10-system review is otherwise negative except HPI above.      PAST MEDICAL & SURGICAL HISTORY:  Chronic sinus infections    Denies past surgical history    FAMILY HISTORY:    Family history not pertinent as reviewed with the patient.    SOCIAL HISTORY:  Denies any toxic habits***    ALLERGIES: NKA Allergy Status Unknown      HOME MEDICATIONS: ***  Home Medications:      --------------------------------------------------------------------------------------------  VITALS:  T(C): 36.8 (06-15-25 @ 09:38), Max: 38.9 (06-14-25 @ 22:30)  HR: 83 (06-15-25 @ 09:00) (49 - 111)  BP: 109/64 (06-15-25 @ 09:00) (81/49 - 159/87)  RR: 14 (06-15-25 @ 09:00) (10 - 28)  SpO2: 96% (06-15-25 @ 09:00) (96% - 100%)      PHYSICAL EXAM:   General: NAD, lying in bed comfortably  Neuro: A+Ox3  HEENT: extraocular eye movements grossly intact, MMM  Cardio: RRR  Resp: Non labored breathing on RA  GI/Abd: Soft, NT/ND, no rebound/guarding, no masses palpated  Vascular: All 4 extremities warm and well perfused  Musculoskeletal: All 4 extremities moving spontaneously, no limitations, no spinal tenderness  --------------------------------------------------------------------------------------------    LABS                 11.2   14.43  )----------(  180       ( 15 Gopi 2025 02:55 )               33.3      136    |  104    |  13.1   ----------------------------<  87         ( 15 Gopi 2025 02:55 )  4.1     |  22.0   |  0.85     Ca    8.3        ( 15 Gopi 2025 02:55 )  Phos  3.1       ( 15 Gopi 2025 02:55 )  Mg     1.8       ( 15 Gopi 2025 02:55 )            CAPILLARY BLOOD GLUCOSE        Urinalysis Basic - ( 15 Gopi 2025 02:55 )    Color: x / Appearance: x / SG: x / pH: x  Gluc: 87 mg/dL / Ketone: x  / Bili: x / Urobili: x   Blood: x / Protein: x / Nitrite: x   Leuk Esterase: x / RBC: x / WBC x   Sq Epi: x / Non Sq Epi: x / Bacteria: x      ABG - ( 14 Jun 2025 10:49 )  pH: 7.340 /  pCO2: 41    /  pO2: 143   / HCO3: 22    / Base Excess: -3.7  /  SaO2: 100.0               --------------------------------------------------------------------------------------------  IMAGING  ***    ASSESSMENT: Patient is a 39y male with ***    PLAN:    - No indication for acute surgical intervention  - Recommend  - Dispo as per   - Care as per primary team  - Pain control  - OOB/ambulate  - Strict I/Os  - DVT ppx:    Patient and plan discussed with attending, Dr. Yessi Pantoja MD ENT CONSULT     HPI: 39y Male presenting to ED 6/14 AM as a trauma A after being found down on [address], facial trauma, intubated, admitted to SICU. Now extubated. PMH of chronic recurrent sinus infections, denies PSH. Endorses intranasal drug use on evening of 6/13. CT maxillofacial read as "likely septal hematoma" in setting of bilateral nasal bone fractures, ENT consulted.    ROS: 10-system review is otherwise negative except HPI above.      PAST MEDICAL & SURGICAL HISTORY:  Chronic sinus infections    Denies past surgical history    SOCIAL HISTORY:  intranasal drug use    ALLERGIES: NKA Allergy Status Unknown    HOME MEDICATIONS: none      --------------------------------------------------------------------------------------------  VITALS:  T(C): 36.8 (06-15-25 @ 09:38), Max: 38.9 (06-14-25 @ 22:30)  HR: 83 (06-15-25 @ 09:00) (49 - 111)  BP: 109/64 (06-15-25 @ 09:00) (81/49 - 159/87)  RR: 14 (06-15-25 @ 09:00) (10 - 28)  SpO2: 96% (06-15-25 @ 09:00) (96% - 100%)      PHYSICAL EXAM:   General: NAD, lying in bed comfortably  Neuro: A+Ox3  HEENT: bruising under b/l eyes, nasal swelling, mild bridge tenderness, minimal air movement through passages. Question of mild fleshy bump from R nares septum. Forehead lacerations. Lacerations to lips  Cardio: RRR  Resp: Non labored breathing on RA    --------------------------------------------------------------------------------------------    LABS                 11.2   14.43  )----------(  180       ( 15 Gopi 2025 02:55 )               33.3      136    |  104    |  13.1   ----------------------------<  87         ( 15 Gopi 2025 02:55 )  4.1     |  22.0   |  0.85     Ca    8.3        ( 15 Gopi 2025 02:55 )  Phos  3.1       ( 15 Gopi 2025 02:55 )  Mg     1.8       ( 15 Gopi 2025 02:55 )    ABG - ( 14 Jun 2025 10:49 )  pH: 7.340 /  pCO2: 41    /  pO2: 143   / HCO3: 22    / Base Excess: -3.7  /  SaO2: 100.0       --------------------------------------------------------------------------------------------  IMAGING  EXAM: CT MAXILLOFACIAL ORDERED BY: RADHA HAMMONDS    PROCEDURE DATE: 06/14/2025    INTERPRETATION: CLINICAL INDICATION: Trauma.    TECHNIQUE: Thin section axial CT images are obtained through the maxillofacial bones and mandible. Images are reformatted in the coronal and sagittal planes.    FINDINGS:    Comminuted nasal bone fractures with associated overlying facial lacerations and subcutaneous emphysema. Likely nasal septal hematoma. The mandible is intact. The temporomandibular joints are not dislocated. Mild mucosal thickening of the paranasal sinuses with multiple air-fluid levels. The optic globes are smooth and symmetric in contour. The retrobulbar fat is well-preserved. The extraocular muscles are not enlarged or deviated. No radiopaque foreign body is identified.    IMPRESSION:    Comminuted nasal bone fractures with associated overlying facial lacerations and subcutaneous emphysema. Likely nasal septal hematoma.    ASSESSMENT: Patient is a 39y male with b/l nasal bone fractures, lip laceration, question of mild R nasal septal hematoma, now >24hrs after injury.    PLAN:    - No indication for acute surgical intervention, lip laceration to heal by secondary intention  - Recommend outpatient follow up in ENT clinic  - Final plan pending attending review  - Care as per primary team    Patient and plan discussed with attending, Dr. Quynh Pantoja MD ENT CONSULT     HPI: 39y Male presenting to ED 6/14 AM as a trauma A after being found down on [address], facial trauma, intubated, admitted to SICU. Now extubated. PMH of chronic recurrent sinus infections, denies PSH. Endorses intranasal drug use on evening of 6/13. CT maxillofacial read as "likely septal hematoma" in setting of bilateral nasal bone fractures, ENT consulted.    ROS: 10-system review is otherwise negative except HPI above.      PAST MEDICAL & SURGICAL HISTORY:  Chronic sinus infections    Denies past surgical history    SOCIAL HISTORY:  intranasal drug use    ALLERGIES: NKA Allergy Status Unknown    HOME MEDICATIONS: none      --------------------------------------------------------------------------------------------  VITALS:  T(C): 36.8 (06-15-25 @ 09:38), Max: 38.9 (06-14-25 @ 22:30)  HR: 83 (06-15-25 @ 09:00) (49 - 111)  BP: 109/64 (06-15-25 @ 09:00) (81/49 - 159/87)  RR: 14 (06-15-25 @ 09:00) (10 - 28)  SpO2: 96% (06-15-25 @ 09:00) (96% - 100%)      PHYSICAL EXAM:   General: NAD, lying in bed comfortably  Neuro: A+Ox3  HEENT: bruising under b/l eyes, nasal swelling, mild bridge tenderness, minimal air movement through passages. Question of mild fleshy bump from R nares septum. Forehead lacerations. Lacerations to lips  Cardio: RRR  Resp: Non labored breathing on RA    --------------------------------------------------------------------------------------------    LABS                 11.2   14.43  )----------(  180       ( 15 Gopi 2025 02:55 )               33.3      136    |  104    |  13.1   ----------------------------<  87         ( 15 Gopi 2025 02:55 )  4.1     |  22.0   |  0.85     Ca    8.3        ( 15 Gopi 2025 02:55 )  Phos  3.1       ( 15 Gopi 2025 02:55 )  Mg     1.8       ( 15 Gopi 2025 02:55 )    ABG - ( 14 Jun 2025 10:49 )  pH: 7.340 /  pCO2: 41    /  pO2: 143   / HCO3: 22    / Base Excess: -3.7  /  SaO2: 100.0       --------------------------------------------------------------------------------------------  IMAGING  EXAM: CT MAXILLOFACIAL ORDERED BY: RADHA HAMMONDS    PROCEDURE DATE: 06/14/2025    INTERPRETATION: CLINICAL INDICATION: Trauma.    TECHNIQUE: Thin section axial CT images are obtained through the maxillofacial bones and mandible. Images are reformatted in the coronal and sagittal planes.    FINDINGS:    Comminuted nasal bone fractures with associated overlying facial lacerations and subcutaneous emphysema. Likely nasal septal hematoma. The mandible is intact. The temporomandibular joints are not dislocated. Mild mucosal thickening of the paranasal sinuses with multiple air-fluid levels. The optic globes are smooth and symmetric in contour. The retrobulbar fat is well-preserved. The extraocular muscles are not enlarged or deviated. No radiopaque foreign body is identified.    IMPRESSION:    Comminuted nasal bone fractures with associated overlying facial lacerations and subcutaneous emphysema. Likely nasal septal hematoma.    ASSESSMENT: Patient is a 39y male with b/l nasal bone fractures, lip laceration, question of mild R nasal septal hematoma, now >24hrs after injury, low suspicion    PLAN:    - No indication for acute surgical intervention, lip laceration to heal by secondary intention  - Will likely require OR for closed reduction of b/l nasal bone fx  - Care as per primary team    Patient and plan discussed with attending, Dr. Quynh Pantoja MD

## 2025-06-15 NOTE — PROGRESS NOTE ADULT - SUBJECTIVE AND OBJECTIVE BOX
24h Events: patient was successfully extubated on the overnight, no acute respiratory complaints post extubation, hemodynamically well, evans removed and undergoing trial of void, advanced diet and tertiary examination complete      ICU Vital Signs Last 24 Hrs  T(C): 36.8 (15 Gopi 2025 09:38), Max: 38.9 (14 Jun 2025 22:30)  T(F): 98.3 (15 Gopi 2025 09:38), Max: 102 (14 Jun 2025 22:30)  HR: 107 (15 Gopi 2025 10:00) (65 - 111)  BP: 116/66 (15 Gopi 2025 10:00) (81/49 - 159/87)  BP(mean): 79 (15 Gopi 2025 10:00) (61 - 112)  ABP: --  ABP(mean): --  RR: 20 (15 Gopi 2025 10:00) (10 - 28)  SpO2: 98% (15 Gopi 2025 10:00) (96% - 100%)    O2 Parameters below as of 14 Jun 2025 21:03      O2 Concentration (%): 21        I&O's Detail    14 Jun 2025 07:01  -  15 Gopi 2025 07:00  --------------------------------------------------------  IN:    Dexmedetomidine: 10.8 mL    FentaNYL: 88.3 mL    IV PiggyBack: 100 mL    IV PiggyBack: 25 mL    multiple electrolytes Injection Type 1.: 1200 mL    Propofol: 90.5 mL  Total IN: 1514.6 mL    OUT:    Indwelling Catheter - Urethral (mL): 1650 mL  Total OUT: 1650 mL    Total NET: -135.4 mL      15 Gopi 2025 07:01  -  15 Gopi 2025 10:48  --------------------------------------------------------  IN:    IV PiggyBack: 50 mL  Total IN: 50 mL    OUT:  Total OUT: 0 mL    Total NET: 50 mL          ABG - ( 14 Jun 2025 10:49 )  pH, Arterial: 7.340 pH, Blood: x     /  pCO2: 41    /  pO2: 143   / HCO3: 22    / Base Excess: -3.7  /  SaO2: 100.0               MEDICATIONS  (STANDING):  bacitracin   Ointment 1 Application(s) Topical daily  chlorhexidine 2% Cloths 1 Application(s) Topical daily  fluticasone propionate 50 MICROgram(s)/spray Nasal Spray 1 Spray(s) Both Nostrils two times a day  melatonin 5 milliGRAM(s) Oral at bedtime  oxymetazoline 0.05% Nasal Spray 1 Spray(s) Both Nostrils two times a day  pantoprazole  Injectable 40 milliGRAM(s) IV Push daily  polyethylene glycol 3350 17 Gram(s) Oral daily  senna 2 Tablet(s) Oral at bedtime    MEDICATIONS  (PRN):  acetaminophen   IVPB .. 1000 milliGRAM(s) IV Intermittent every 6 hours PRN Temp greater or equal to 38C (100.4F), Mild Pain (1 - 3)  HYDROmorphone  Injectable 0.5 milliGRAM(s) IV Push every 4 hours PRN breakthrough  oxyCODONE    IR 5 milliGRAM(s) Oral every 4 hours PRN Moderate Pain (4 - 6)  oxyCODONE    IR 10 milliGRAM(s) Oral every 4 hours PRN Severe Pain (7 - 10)      Physical Exam:    Gen: Resting comfortably in bed    HEENT: PERRL, EOMI, severe periorbital bruising and ecchymosis     Neurological: Alert and oriented x3 without focal deficit    Neck: Trachea midline, no evidence of JVD    Pulmonary: CTA B/L,  equal rise and fall of the chest, no increased WOB    Cardiovascular: regular rate and rhythm    Gastrointestinal: Soft, non-tender, non-distended    :Voiding    Skin: Intact, multiple facial lacerations, l eyebrow sutured x2, lip laceration, periorbital bruising     LABS:  CBC Full  -  ( 15 Gopi 2025 02:55 )  WBC Count : 14.43 K/uL  RBC Count : 3.76 M/uL  Hemoglobin : 11.2 g/dL  Hematocrit : 33.3 %  Platelet Count - Automated : 180 K/uL  Mean Cell Volume : 88.6 fl  Mean Cell Hemoglobin : 29.8 pg  Mean Cell Hemoglobin Concentration : 33.6 g/dL  Auto Neutrophil # : x  Auto Lymphocyte # : x  Auto Monocyte # : x  Auto Eosinophil # : x  Auto Basophil # : x  Auto Neutrophil % : x  Auto Lymphocyte % : x  Auto Monocyte % : x  Auto Eosinophil % : x  Auto Basophil % : x    06-15    136  |  104  |  13.1  ----------------------------<  87  4.1   |  22.0  |  0.85    Ca    8.3[L]      15 Gopi 2025 02:55  Phos  3.1     06-15  Mg     1.8     06-15    TPro  6.2[L]  /  Alb  4.1  /  TBili  0.3[L]  /  DBili  x   /  AST  28  /  ALT  18  /  AlkPhos  35[L]  06-14    PT/INR - ( 14 Jun 2025 06:35 )   PT: 13.5 sec;   INR: 1.20 ratio         PTT - ( 14 Jun 2025 06:35 )  PTT:24.9 sec  Urinalysis Basic - ( 15 Gopi 2025 02:55 )    Color: x / Appearance: x / SG: x / pH: x  Gluc: 87 mg/dL / Ketone: x  / Bili: x / Urobili: x   Blood: x / Protein: x / Nitrite: x   Leuk Esterase: x / RBC: x / WBC x   Sq Epi: x / Non Sq Epi: x / Bacteria: x      RECENT CULTURES:      LIVER FUNCTIONS - ( 14 Jun 2025 06:35 )  Alb: 4.1 g/dL / Pro: 6.2 g/dL / ALK PHOS: 35 U/L / ALT: 18 U/L / AST: 28 U/L / GGT: x           CARDIAC MARKERS ( 14 Jun 2025 06:35 )  x     / x     / x     / x     / 5.8 ng/mL

## 2025-06-15 NOTE — PROGRESS NOTE ADULT - NS ATTEND AMEND GEN_ALL_CORE FT
34-year-old male s/p fall with TBI, facial fractures, and septal hematoma     # traumatic subarachnoid hemorrhage   - Q4 neurochecks  - Pain control   - normalize sleep wake   - PT/OT     #facial fractures, lip laceration, septal hematoma   - ENT called   - sinus precautions   - flonase  - Hold DVT ppx   - s/p 1 dose of Ancef     #GERD: c/w PPI      #Mesenteric haziness:  abd soft nontender   - diet as tolerate   - monitor abdominal exam     #Substance abuse: SBIRT   #At risk for PTSD: PTSD screen    Agree with the above documented by YAKOV Napier

## 2025-06-15 NOTE — CHART NOTE - NSCHARTNOTEFT_GEN_A_CORE
SICU TRANSFER NOTE  -----------------------------  ICU Admission Date: 6/14  Transfer Date: 06-15-25 @ 10:27    Admission Diagnosis:   1. SDH / SAH  2. Comminuted Nasal Bone FX  3. Septal Hematoma     Active Problems/injuries:   1. SDH / SAH  2. Comminuted Nasal Bone FX  3. Septal Hematoma     Procedures:     Consultants:  [x] ENT - septal hematoma   [x] Neurosurgery - sdh/sah      Medications  acetaminophen   IVPB .. 1000 milliGRAM(s) IV Intermittent every 6 hours PRN  bacitracin   Ointment 1 Application(s) Topical daily  chlorhexidine 2% Cloths 1 Application(s) Topical daily  fluticasone propionate 50 MICROgram(s)/spray Nasal Spray 1 Spray(s) Both Nostrils two times a day  HYDROmorphone  Injectable 0.5 milliGRAM(s) IV Push every 4 hours PRN  oxyCODONE    IR 5 milliGRAM(s) Oral every 4 hours PRN  oxyCODONE    IR 10 milliGRAM(s) Oral every 4 hours PRN  oxymetazoline 0.05% Nasal Spray 1 Spray(s) Both Nostrils two times a day  pantoprazole  Injectable 40 milliGRAM(s) IV Push daily  polyethylene glycol 3350 17 Gram(s) Oral daily  senna 2 Tablet(s) Oral at bedtime      [X] I attest I have reviewed and reconciled all medications prior to transfer    ASSESSMENT: ~39yom BIBEMS s/p found down on the [facility]. patient was combative, altered mental status, and not protecting his airway due to large volume hemorrhage requiring field RSI    PLAN:    Neuro:   #Subdural Hematoma  #Subarachnoid Hematoma  - Multimodal pain regimen w/ tylenol and oxycodone and dilaudid prn  - Continue Melatonin 5mg QHS  - Continue Neurological checks Q4  - Delirium precautions  - Optimize sleep/wake cycle     CV:   - Maintain MAP > 65  - Continue hemodynamic monitoring    Pulm:   - Maintain sPO2 >92%  - Pulmonary toilet, IS, OOBTC    GI/Nutrition:   - Continue Soft and Bite Sized Diet  - Protonix ppx  - Bowel regimen  - Monitor bowel movements    /Renal:   - Monitor kidney fxn  - Monitor UOP  - Replete electrolytes PRN (Mg >2, Phos >3, K >4)  - Voiding spontaneously    ID:  - Monitor fever curve  - Monitor for leukocytosis  - s/p 1 dose ancef for facial lacerations     Endo:  - Monitor blood glucose  - Maintain euglycemia, 140-180    Heme/DVT Prophylaxis:  - SCDs  - Monitor H/H  - Chemical prophylaxis held given septal hematoma    Skin:  - Repositioning for DTI prevention while in bed  - L eyebrow laceration sutured close 6/14    ENT:  #Septal Hematoma  #Nasal Bone Fractures   - Awaiting ENT recommendations  - Continue Afrin 1 spray BID x 2 days - d/c after 2 days to avoid rhinitis medicamentosa   - Continue flonase 1 spray BID  - Continue Sinus precautions given b/l nasal bone fx  - Appreciate ENT recs    Lines:  - Peripheral IV's    Dispo: I have discussed this case with Trauma upon transfer and all questions regarding ICU course were answered.  The following items are to be followed up:  - Care per primary team  - PT recs  - f/u ENT consult  - consider face/plastics consult if clinically necessary for lip laceration  - f/u GHB + serum drug screen - patient endorsed post extubation to taking GHB as likely reason for his initial altered mental status     Tertiary [X] - 6/15  PTSD [X] - placed 6/15

## 2025-06-15 NOTE — CHART NOTE - NSCHARTNOTEFT_GEN_A_CORE
SICU TRANSFER NOTE  -----------------------------  ICU Admission Date: 6/14  Transfer Date: 06-15-25 @ 10:27    Admission Diagnosis:   1. SDH / SAH  2. Comminuted Nasal Bone FX  3. Septal Hematoma     Active Problems/injuries:   1. SDH / SAH  2. Comminuted Nasal Bone FX  3. Septal Hematoma     Procedures:     Consultants:  [x] ENT - septal hematoma   [x] Neurosurgery - sdh/sah      Medications  acetaminophen   IVPB .. 1000 milliGRAM(s) IV Intermittent every 6 hours PRN  bacitracin   Ointment 1 Application(s) Topical daily  chlorhexidine 2% Cloths 1 Application(s) Topical daily  fluticasone propionate 50 MICROgram(s)/spray Nasal Spray 1 Spray(s) Both Nostrils two times a day  HYDROmorphone  Injectable 0.5 milliGRAM(s) IV Push every 4 hours PRN  oxyCODONE    IR 5 milliGRAM(s) Oral every 4 hours PRN  oxyCODONE    IR 10 milliGRAM(s) Oral every 4 hours PRN  oxymetazoline 0.05% Nasal Spray 1 Spray(s) Both Nostrils two times a day  pantoprazole  Injectable 40 milliGRAM(s) IV Push daily  polyethylene glycol 3350 17 Gram(s) Oral daily  senna 2 Tablet(s) Oral at bedtime      [X] I attest I have reviewed and reconciled all medications prior to transfer    ASSESSMENT: ~39yom BIBEMS s/p found down on the [facility]. patient was combative, altered mental status, and not protecting his airway due to large volume hemorrhage requiring field RSI    PLAN:  Neuro:   #Subdural Hematoma  #Subarachnoid Hematoma  - Multimodal pain regimen w/ tylenol and oxycodone and dilaudid prn  - Continue Melatonin 5mg QHS    GI/Nutrition:   - Continue Soft and Bite Sized Diet  - Protonix ppx  - Bowel regimen    /Renal:   - Monitor kidney fxn  - Voiding spontaneously    ID:  - Monitor for leukocytosis  - s/p 1 dose ancef for facial lacerations     Heme/DVT Prophylaxis:  - SCDs  - Monitor H/H  - Chemical prophylaxis held given septal hematoma, f/u ENT    Skin:  - L eyebrow laceration sutured close 6/14    ENT:  #Septal Hematoma  #Nasal Bone Fractures   - Continue Afrin 1 spray BID x 2 days - d/c after 2 days to avoid rhinitis medicamentosa   - Continue flonase 1 spray BID  - Continue Sinus precautions given b/l nasal bone fx  - Appreciate ENT recs    Dispo: I have discussed this case with SICU upon transfer and all questions regarding ICU course were answered.    The following items are to be followed up:  - PT recs  - f/u GHB + serum drug screen - patient endorsed post extubation to taking GHB as likely reason for his initial altered mental status     Tertiary [X] - 6/15  PTSD [X] - placed 6/15.

## 2025-06-15 NOTE — EEG REPORT - NS EEG TEXT BOX
DON BENJAMIN [id number]     Study Date: 		06-14-25 (1441) - 06-15-25 (0800)   Duration: 17h 19min   --------------------------------------------------------------------------------------------------  History:  CC/ HPI Patient is a 39y old  Male who presents with a chief complaint of   MEDICATIONS  (STANDING):    --------------------------------------------------------------------------------------------------  Study Interpretation:    [[[Abbreviation Key:  PDR=alpha rhythm/posterior dominant rhythm. A-P=anterior posterior gradient.  Amplitude: ‘very low’:<20; ‘low’:20-50; ‘medium’:; ‘high’:>200uV.  Persistence for periodic/rhythmic patterns (% of epoch) ‘rare’:<1%; ‘occasional’:1-10%; ‘frequent’:10-50%; ‘abundant’:50-90%; ‘continuous’:>90%.  Persistence for sporadic discharges: ‘rare’:<1/hr; ‘occasional’:1/min-1/hr; ‘frequent’:>1/min; ‘abundant’:>1/10 sec.  GRDA=generalized rhythmic delta activity; FIRDA=frontal intermittent GRDA; LRDA=lateralized rhythmic delta activity; TIRDA=temporal intermittent rhythmic delta activity;  LPD=PLED=lateralized periodic discharges; GPD=generalized periodic discharges; BiPDs=BiPLEDs=bilateral independent periodic epileptiform discharges; SIRPID=stimulus induced rhythmic, periodic, or ictal appearing discharges; BIRDs=brief potentially ictal rhythmic discharges >4 Hz, lasting .5-10s; PFA=paroxysmal bursts of beta/gamma; LVFA=low voltage fast activity.  Modifiers: +F=with fast component; +S=with spike component; +R=with rhythmic component.  S-B=burst suppression pattern.  Max=maximal. N1-drowsy; N2-stage II sleep; N3-slow wave sleep. SSS/BETS=small sharp spikes/benign epileptiform transients of sleep. HV=hyperventilation; PS=photic stimulation]]]    Daily EEG Visual Analysis    FINDINGS:      Background:  Continuity: continuous  Symmetry: symmetric  PDR: present after about 9 PM 9 Hz activity, with amplitude to 40 uV, that attenuated to eye opening.  Low amplitude frontal beta noted in wakefulness.  Reactivity: present  Voltage: normal, mostly 20-150uV  Anterior Posterior Gradient: present  Other background findings: none  Breach: absent    Background Slowing:  Generalized slowing: initially moderate-severe slowing, however, improves to normal background after around 9PM  Focal slowing: none was present.    State Changes:   -Drowsiness noted with increased slowing, attenuation of fast activity, vertex transients.  -Present with N2 sleep transients with symmetric spindles and K-complexes.    Sporadic Epileptiform Discharges:    None    Rhythmic and Periodic Patterns (RPPs):  None     Electrographic and Electroclinical seizures:  None    Other Clinical Events:  Event button push for forward-backward body shifting and LUE movement (1911) not associated with epileptiform abnormalities electrographically.     Activation Procedures:   -Hyperventilation was not performed.    -Photic stimulation was not performed.      Artifacts:  Intermittent myogenic and movement artifacts were noted.    ECG:  The heart rate on single channel ECG was predominantly between 70-90 BPM.    EEG Classification / Summary:  Abnormal  EEG in the awake / drowsy / asleep states:   -Initial generalized background slowing, improving to normal background during later stages of recording    Clinical Impression:   -Initial moderate-severe diffuse cerebral dysfunction improving to normal after around 9PM, likely as sedation was weaned and patient extubated.   -There were no epileptiform abnormalities recorded.    -Event button push for forwards-backwards body shifting and LUE movement (1911 6/14) not associated with electrographic epileptiform activity.     *PRELIM READ, ATTENDING REVIEW PENDING*      -------------------------------------------------------------------------------------------------------  [facility] EEG Reading Room Ph#: [phone]  Epilepsy Answering Service after 5PM and before 8:30AM: Ph#: [phone]    Salvador Chowdhury DO   Epilepsy Fellow    DON BENJAMIN [id number]     Study Date: 		06-14-25 (1441) - 06-15-25 (0800)   Duration: 17h 19min   --------------------------------------------------------------------------------------------------  History:  CC/ HPI Patient is a 39y old  Male who presents with a chief complaint of   MEDICATIONS  (STANDING):    --------------------------------------------------------------------------------------------------  Study Interpretation:    [[[Abbreviation Key:  PDR=alpha rhythm/posterior dominant rhythm. A-P=anterior posterior gradient.  Amplitude: ‘very low’:<20; ‘low’:20-50; ‘medium’:; ‘high’:>200uV.  Persistence for periodic/rhythmic patterns (% of epoch) ‘rare’:<1%; ‘occasional’:1-10%; ‘frequent’:10-50%; ‘abundant’:50-90%; ‘continuous’:>90%.  Persistence for sporadic discharges: ‘rare’:<1/hr; ‘occasional’:1/min-1/hr; ‘frequent’:>1/min; ‘abundant’:>1/10 sec.  GRDA=generalized rhythmic delta activity; FIRDA=frontal intermittent GRDA; LRDA=lateralized rhythmic delta activity; TIRDA=temporal intermittent rhythmic delta activity;  LPD=PLED=lateralized periodic discharges; GPD=generalized periodic discharges; BiPDs=BiPLEDs=bilateral independent periodic epileptiform discharges; SIRPID=stimulus induced rhythmic, periodic, or ictal appearing discharges; BIRDs=brief potentially ictal rhythmic discharges >4 Hz, lasting .5-10s; PFA=paroxysmal bursts of beta/gamma; LVFA=low voltage fast activity.  Modifiers: +F=with fast component; +S=with spike component; +R=with rhythmic component.  S-B=burst suppression pattern.  Max=maximal. N1-drowsy; N2-stage II sleep; N3-slow wave sleep. SSS/BETS=small sharp spikes/benign epileptiform transients of sleep. HV=hyperventilation; PS=photic stimulation]]]    Daily EEG Visual Analysis    FINDINGS:      Background:  Continuity: continuous  Symmetry: symmetric  PDR: present after about 9 PM 9 Hz activity, with amplitude to 40 uV, that attenuated to eye opening.  Low amplitude frontal beta noted in wakefulness.  Reactivity: present  Voltage: normal, mostly 20-150uV  Anterior Posterior Gradient: present  Other background findings: none  Breach: absent    Background Slowing:  Generalized slowing: initially moderate-severe slowing, however, improves to normal background after around 9PM  Focal slowing: none was present.    State Changes:   -Drowsiness noted with increased slowing, attenuation of fast activity, vertex transients.  -Present with N2 sleep transients with symmetric spindles and K-complexes.    Sporadic Epileptiform Discharges:    None    Rhythmic and Periodic Patterns (RPPs):  None     Electrographic and Electroclinical seizures:  None    Other Clinical Events:  Event button push for forward-backward body shifting and LUE movement (1911) not associated with epileptiform abnormalities electrographically.     Activation Procedures:   -Hyperventilation was not performed.    -Photic stimulation was not performed.      Artifacts:  Intermittent myogenic and movement artifacts were noted.    ECG:  The heart rate on single channel ECG was predominantly between 70-90 BPM.    EEG Classification / Summary:  Abnormal  EEG in the awake / drowsy / asleep states:   -Initial generalized background slowing, improving to normal background during later stages of recording    Clinical Impression:   -Initial moderate-severe diffuse cerebral dysfunction improving to normal after around 9PM, likely as sedation was weaned and patient extubated.   -There were no epileptiform abnormalities recorded.    -Event button push for forwards-backwards body shifting and LUE movement (1911 6/14) not associated with electrographic epileptiform activity.           -------------------------------------------------------------------------------------------------------  [facility] EEG Reading Room Ph#: [phone]  Epilepsy Answering Service after 5PM and before 8:30AM: Ph#: [phone]    Salvador Chowdhury DO   Epilepsy Fellow

## 2025-06-15 NOTE — CONSULT NOTE ADULT - CONSULT REASON
0.3 cm acute SDH, small volume SAH along the left anterior frontal lobe, as well as possible punctate IPH in the left anterior frontal lobe.
Septal hematoma, facial fractures

## 2025-06-15 NOTE — CHART NOTE - NSCHARTNOTEFT_GEN_A_CORE
Tertiary Trauma Survey (TTS)    Date of TTS: 06-15-25 @ 06:19                             Admit Date: 06-14-25 @ 06:47      Trauma Activation:    List Injuries Identified to Date:  1. SDH / SAH  2. Nasal bone fracture  3. Stranding in RLQ, c/f mesenteric injury   4. Facial lacerations       List Operative and Interventional Radiological Procedures: NA/A      - PTSD  [ X ]  Date: SW consult placed 6/15    - SBIRT [ X ]  Date: 6/14    - Geriatric consult: N/A      Physical Exam:  Vital Signs:      Primary Survey:    A - airway intact  B - bilateral breath sounds and good chest rise  C - palpable pulses in all extremities  D - GCS 15, DEVRIES  Exposure obtained    Secondary Survey:   General: NAD  HEENT: Normocephalic, EOMI, PEERL. +B/l periorbital ecchymosis, Lower lip laceration   Neck: Soft, midline trachea. no cspine tenderness  Chest: No chest wall tenderness. or subq  emphysema   Cardiac: S1, S2, RRR  Respiratory: Bilateral breath sounds, clear and equal bilaterally  Abdomen: Soft, non-distended, non-tender, no rebound,   Groin: Normal appearing, pelvis stable   Ext: palp radial b/l UE, b/l DP palp in Lower Extrem.   Back: no TTP, no palpable runoff/stepoff/deformity         RADIOLOGICAL FINDINGS REVIEW:  < from: Xray Chest 1 View AP/PA. (06.14.25 @ 07:06) >    ET tube in good position. No radiographic evidence of   traumatic injury on these films.    < end of copied text >    < from: CT Head No Cont (06.14.25 @ 07:17) >    CT BRAIN:    0.3 cm acute subdural hematoma as well as small volume subarachnoid   hemorrhage along the left anterior frontal lobe, as well as possible   punctate intraparenchymal hemorrhage in the left anterior frontal lobe.    CT CERVICAL SPINE:    No acute cervical fracture or traumatic malalignment.    MRI would be required to evaluate the ligamentous structures at higher   sensitivity as well as for better evaluation of the cervical canal and   its contents.    < end of copied text >    < from: CT Maxillofacial No Cont (06.14.25 @ 07:20) >    Comminuted nasal bone fractures with associated overlying facial   lacerations and subcutaneous emphysema. Likely nasal septal hematoma.    < end of copied text >    < from: CT Chest w/ IV Cont (06.14.25 @ 07:24) >    No acute solid organ injury. No acute vascular injury within limitations   of this non-ECG gated study. No acute displaced rib fracture or   pneumothorax. No acute displaced pelvic fracture.    Nonspecific slight stranding adjacent to the right colon in the right   lower quadrant. Fecalized nondistended small bowel loops in the posterior   dependent pelvis with slight associated mesenteric haziness. No ascites.   No free air. Close continued clinical and imaging follow-up is   recommended to exclude mesenteric injury.    Intubated. Globular central airways secretions. Multifocal patchy   groundglass opacities of thelower lobes and right upper lobe concerning   for multifocal pneumonia or other inflammatory process. Follow to   resolution with repeat chest CT in one month, or sooner as clinically   warranted.    < end of copied text >    < from: CT Angio Head w/ IV Cont (06.14.25 @ 07:26) >        1.   Right carotid system:  No hemodynamically significant   stenosis/injury.        2.   Left carotid system:  No hemodynamically significant   stenosis/injury.        3.   Intracranial circulation:  No significant vascular lesion/injury.        4. Lungs:  Faint groundglass opacities in the superior segments of   the lower lobes which may reflect developing aspiration pneumonia. ET   tube in satisfactory position.    < end of copied text >    < from: CT Head No Cont (06.14.25 @ 13:55) >     No significant change, without interval rebleeding.    < end of copied text >          INCIDENTAL FINDINGS:    [ X ] No    [ ] Yes, Findings are:        [ X ] Tertiary exam complete, there are no new injuries identified    [ ] Tertiary exam done, new injuries identified are:                [ ] Imaging ordered: Tertiary Trauma Survey (TTS)    Date of TTS: 06-15-25 @ 06:19                             Admit Date: 06-14-25 @ 06:47      Trauma Activation:    List Injuries Identified to Date:  1. SDH / SAH  2. Nasal bone fracture  3. Stranding in RLQ, c/f mesenteric injury   4. Facial lacerations       List Operative and Interventional Radiological Procedures: NA/A      - PTSD  [ X ]  Date: SW consult placed 6/15    - SBIRT [ X ]  Date: 6/14    - Geriatric consult: N/A      Physical Exam:  Vital Signs:      Primary Survey:    A - airway intact  B - bilateral breath sounds and good chest rise  C - palpable pulses in all extremities  D - GCS 15, DEVRIES  Exposure obtained    Secondary Survey:   General: NAD  HEENT: Normocephalic, EOMI, PEERL. +B/l periorbital ecchymosis, Lower lip laceration   Neck: Soft, midline trachea. no cspine tenderness  Chest: No chest wall tenderness. or subq  emphysema   Cardiac: S1, S2, RRR  Respiratory: Bilateral breath sounds, clear and equal bilaterally  Abdomen: Soft, non-distended, non-tender, no rebound,   Groin: Normal appearing, pelvis stable   Ext: palp radial b/l UE, b/l DP palp in Lower Extrem.   Back: no TTP, no palpable runoff/stepoff/deformity         RADIOLOGICAL FINDINGS REVIEW:  < from: Xray Chest 1 View AP/PA. (06.14.25 @ 07:06) >    ET tube in good position. No radiographic evidence of   traumatic injury on these films.    < end of copied text >    < from: CT Head No Cont (06.14.25 @ 07:17) >    CT BRAIN:    0.3 cm acute subdural hematoma as well as small volume subarachnoid   hemorrhage along the left anterior frontal lobe, as well as possible   punctate intraparenchymal hemorrhage in the left anterior frontal lobe.    CT CERVICAL SPINE:    No acute cervical fracture or traumatic malalignment.    MRI would be required to evaluate the ligamentous structures at higher   sensitivity as well as for better evaluation of the cervical canal and   its contents.    < end of copied text >    < from: CT Maxillofacial No Cont (06.14.25 @ 07:20) >    Comminuted nasal bone fractures with associated overlying facial   lacerations and subcutaneous emphysema. Likely nasal septal hematoma.    < end of copied text >    < from: CT Chest w/ IV Cont (06.14.25 @ 07:24) >    No acute solid organ injury. No acute vascular injury within limitations   of this non-ECG gated study. No acute displaced rib fracture or   pneumothorax. No acute displaced pelvic fracture.    Nonspecific slight stranding adjacent to the right colon in the right   lower quadrant. Fecalized nondistended small bowel loops in the posterior   dependent pelvis with slight associated mesenteric haziness. No ascites.   No free air. Close continued clinical and imaging follow-up is   recommended to exclude mesenteric injury.    Intubated. Globular central airways secretions. Multifocal patchy   groundglass opacities of thelower lobes and right upper lobe concerning   for multifocal pneumonia or other inflammatory process. Follow to   resolution with repeat chest CT in one month, or sooner as clinically   warranted.    < end of copied text >    < from: CT Angio Head w/ IV Cont (06.14.25 @ 07:26) >        1.   Right carotid system:  No hemodynamically significant   stenosis/injury.        2.   Left carotid system:  No hemodynamically significant   stenosis/injury.        3.   Intracranial circulation:  No significant vascular lesion/injury.        4. Lungs:  Faint groundglass opacities in the superior segments of   the lower lobes which may reflect developing aspiration pneumonia. ET   tube in satisfactory position.    < end of copied text >    < from: CT Head No Cont (06.14.25 @ 13:55) >     No significant change, without interval rebleeding.    < end of copied text >          INCIDENTAL FINDINGS:    [ X ] No    [ ] Yes, Findings are:        [ ] Tertiary exam complete, there are no new injuries identified    [ X ] Tertiary exam done, new injuries identified are: B/l knee abrasions                [ ] Imaging ordered: b/l knee Xrays

## 2025-06-16 ENCOUNTER — TRANSCRIPTION ENCOUNTER (OUTPATIENT)
Age: 35
End: 2025-06-16

## 2025-06-16 VITALS
HEART RATE: 61 BPM | RESPIRATION RATE: 16 BRPM | DIASTOLIC BLOOD PRESSURE: 73 MMHG | OXYGEN SATURATION: 98 % | SYSTOLIC BLOOD PRESSURE: 132 MMHG

## 2025-06-16 DIAGNOSIS — S02.2XXA FRACTURE OF NASAL BONES, INITIAL ENCOUNTER FOR CLOSED FRACTURE: ICD-10-CM

## 2025-06-16 DIAGNOSIS — S01.511A LACERATION WITHOUT FOREIGN BODY OF LIP, INITIAL ENCOUNTER: ICD-10-CM

## 2025-06-16 LAB
ALBUMIN SERPL ELPH-MCNC: 3.5 G/DL — SIGNIFICANT CHANGE UP (ref 3.3–5.2)
ALP SERPL-CCNC: 39 U/L — LOW (ref 40–120)
ALT FLD-CCNC: 14 U/L — SIGNIFICANT CHANGE UP
ANION GAP SERPL CALC-SCNC: 13 MMOL/L — SIGNIFICANT CHANGE UP (ref 5–17)
AST SERPL-CCNC: 25 U/L — SIGNIFICANT CHANGE UP
BASOPHILS # BLD AUTO: 0.03 K/UL — SIGNIFICANT CHANGE UP (ref 0–0.2)
BASOPHILS NFR BLD AUTO: 0.3 % — SIGNIFICANT CHANGE UP (ref 0–2)
BILIRUB DIRECT SERPL-MCNC: 0.2 MG/DL — SIGNIFICANT CHANGE UP (ref 0–0.3)
BILIRUB INDIRECT FLD-MCNC: 0.6 MG/DL — SIGNIFICANT CHANGE UP (ref 0.2–1)
BILIRUB SERPL-MCNC: 0.7 MG/DL — SIGNIFICANT CHANGE UP (ref 0.4–2)
BUN SERPL-MCNC: 8.8 MG/DL — SIGNIFICANT CHANGE UP (ref 8–20)
CALCIUM SERPL-MCNC: 8.8 MG/DL — SIGNIFICANT CHANGE UP (ref 8.4–10.5)
CHLORIDE SERPL-SCNC: 103 MMOL/L — SIGNIFICANT CHANGE UP (ref 96–108)
CO2 SERPL-SCNC: 22 MMOL/L — SIGNIFICANT CHANGE UP (ref 22–29)
CREAT SERPL-MCNC: 0.78 MG/DL — SIGNIFICANT CHANGE UP (ref 0.5–1.3)
EGFR: 116 ML/MIN/1.73M2 — SIGNIFICANT CHANGE UP
EGFR: 116 ML/MIN/1.73M2 — SIGNIFICANT CHANGE UP
EOSINOPHIL # BLD AUTO: 0.16 K/UL — SIGNIFICANT CHANGE UP (ref 0–0.5)
EOSINOPHIL NFR BLD AUTO: 1.7 % — SIGNIFICANT CHANGE UP (ref 0–6)
GLUCOSE SERPL-MCNC: 87 MG/DL — SIGNIFICANT CHANGE UP (ref 70–99)
HCT VFR BLD CALC: 34.6 % — LOW (ref 39–50)
HGB BLD-MCNC: 11.3 G/DL — LOW (ref 13–17)
IMM GRANULOCYTES # BLD AUTO: 0.05 K/UL — SIGNIFICANT CHANGE UP (ref 0–0.07)
IMM GRANULOCYTES NFR BLD AUTO: 0.5 % — SIGNIFICANT CHANGE UP (ref 0–0.9)
LYMPHOCYTES # BLD AUTO: 1.81 K/UL — SIGNIFICANT CHANGE UP (ref 1–3.3)
LYMPHOCYTES NFR BLD AUTO: 19.5 % — SIGNIFICANT CHANGE UP (ref 13–44)
MAGNESIUM SERPL-MCNC: 2 MG/DL — SIGNIFICANT CHANGE UP (ref 1.6–2.6)
MCHC RBC-ENTMCNC: 29.4 PG — SIGNIFICANT CHANGE UP (ref 27–34)
MCHC RBC-ENTMCNC: 32.7 G/DL — SIGNIFICANT CHANGE UP (ref 32–36)
MCV RBC AUTO: 89.9 FL — SIGNIFICANT CHANGE UP (ref 80–100)
MONOCYTES # BLD AUTO: 0.79 K/UL — SIGNIFICANT CHANGE UP (ref 0–0.9)
MONOCYTES NFR BLD AUTO: 8.5 % — SIGNIFICANT CHANGE UP (ref 2–14)
NEUTROPHILS # BLD AUTO: 6.45 K/UL — SIGNIFICANT CHANGE UP (ref 1.8–7.4)
NEUTROPHILS NFR BLD AUTO: 69.5 % — SIGNIFICANT CHANGE UP (ref 43–77)
NRBC # BLD AUTO: 0 K/UL — SIGNIFICANT CHANGE UP (ref 0–0)
NRBC # FLD: 0 K/UL — SIGNIFICANT CHANGE UP (ref 0–0)
NRBC BLD AUTO-RTO: 0 /100 WBCS — SIGNIFICANT CHANGE UP (ref 0–0)
PHOSPHATE SERPL-MCNC: 2 MG/DL — LOW (ref 2.4–4.7)
PLATELET # BLD AUTO: 185 K/UL — SIGNIFICANT CHANGE UP (ref 150–400)
PMV BLD: 11.1 FL — SIGNIFICANT CHANGE UP (ref 7–13)
POTASSIUM SERPL-MCNC: 4.1 MMOL/L — SIGNIFICANT CHANGE UP (ref 3.5–5.3)
POTASSIUM SERPL-SCNC: 4.1 MMOL/L — SIGNIFICANT CHANGE UP (ref 3.5–5.3)
PROT SERPL-MCNC: 6.6 G/DL — SIGNIFICANT CHANGE UP (ref 6.6–8.7)
RBC # BLD: 3.85 M/UL — LOW (ref 4.2–5.8)
RBC # FLD: 12.6 % — SIGNIFICANT CHANGE UP (ref 10.3–14.5)
SODIUM SERPL-SCNC: 138 MMOL/L — SIGNIFICANT CHANGE UP (ref 135–145)
WBC # BLD: 9.29 K/UL — SIGNIFICANT CHANGE UP (ref 3.8–10.5)
WBC # FLD AUTO: 9.29 K/UL — SIGNIFICANT CHANGE UP (ref 3.8–10.5)

## 2025-06-16 RX ORDER — SODIUM CHLORIDE 0.65 %
2 AEROSOL, SPRAY (ML) NASAL
Qty: 0 | Refills: 0 | DISCHARGE
Start: 2025-06-16

## 2025-06-16 RX ORDER — BACITRACIN 500 UNIT/G
1 OINTMENT (GRAM) TOPICAL
Qty: 0 | Refills: 0 | DISCHARGE
Start: 2025-06-16

## 2025-06-16 RX ORDER — FLUTICASONE PROPIONATE 50 UG/1
1 SPRAY, METERED NASAL
Qty: 11.1 | Refills: 0
Start: 2025-06-16

## 2025-06-16 RX ORDER — SODIUM CHLORIDE 0.65 %
2 AEROSOL, SPRAY (ML) NASAL
Qty: 88 | Refills: 0
Start: 2025-06-16 | End: 2025-06-22

## 2025-06-16 RX ORDER — SOD PHOS DI, MONO/K PHOS MONO 250 MG
1 TABLET ORAL ONCE
Refills: 0 | Status: COMPLETED | OUTPATIENT
Start: 2025-06-16 | End: 2025-06-16

## 2025-06-16 RX ORDER — BACITRACIN 500 UNIT/G
1 OINTMENT (GRAM) TOPICAL
Qty: 14 | Refills: 0
Start: 2025-06-16 | End: 2025-06-22

## 2025-06-16 RX ORDER — LIDOCAINE HCL/EPINEPHRINE/PF 1 %-1:200K
20 AMPUL (ML) INJECTION ONCE
Refills: 0 | Status: DISCONTINUED | OUTPATIENT
Start: 2025-06-16 | End: 2025-06-16

## 2025-06-16 RX ORDER — SODIUM CHLORIDE 0.65 %
1 AEROSOL, SPRAY (ML) NASAL EVERY 4 HOURS
Refills: 0 | Status: DISCONTINUED | OUTPATIENT
Start: 2025-06-16 | End: 2025-06-16

## 2025-06-16 RX ADMIN — Medication 400 MILLIGRAM(S): at 05:28

## 2025-06-16 RX ADMIN — Medication 1 SPRAY(S): at 05:16

## 2025-06-16 RX ADMIN — FLUTICASONE PROPIONATE 1 SPRAY(S): 50 SPRAY, METERED NASAL at 05:16

## 2025-06-16 RX ADMIN — Medication 1 APPLICATION(S): at 11:38

## 2025-06-16 RX ADMIN — ENOXAPARIN SODIUM 30 MILLIGRAM(S): 100 INJECTION SUBCUTANEOUS at 05:16

## 2025-06-16 RX ADMIN — POLYETHYLENE GLYCOL 3350 17 GRAM(S): 17 POWDER, FOR SOLUTION ORAL at 11:37

## 2025-06-16 RX ADMIN — Medication 40 MILLIGRAM(S): at 11:37

## 2025-06-16 RX ADMIN — Medication 1 PACKET(S): at 07:46

## 2025-06-16 RX ADMIN — Medication 1000 MILLIGRAM(S): at 06:15

## 2025-06-16 NOTE — DISCHARGE NOTE PROVIDER - NSDCMRMEDTOKEN_GEN_ALL_CORE_FT
[address], [address]  [place]  Phone: [phone]  Fax: [phone]        Mariangel Tamara  : 1961  PCP: Yuriy Ritter DO  2020    PROGRESS NOTE      HISTORY OF PRESENT ILLNESS  Darlyn Millan is a 60 y.o. male who was seen as a new patient 2020 with c/o neck pain with paraesthesia radiating into the left shoulder in a C4 distribution x 7 months. Pt denies radiation further into the LUE or hand. He has attended PT with dry needling (19-10/10/19; [facility]) with minimal benefit. Pt notes that he has to sleep in certain positions or it exacerbates his symptoms. He denies limited ROM of his left shoulder. Pt reports some episodes of functional bladder incontinence that he has not yet addressed with his urologist. He currently takes Flomax for his prostate. He denies bowel incontinence or paraesthesia or weakness in his lower extremities. Pt was previously seen 16 as a new patient with c/o lumbar and cervical pain that appeared to be myofascial pain and due to facet syndrome. He had completed several sessions of cervical RFA prior to moving to the area, so he was referred to Dr. Erich Carrero to complete the process. He tried lumbar KRAIG and RFA without benefit. He tried Tramadol, Gabapentin, and Baclofen with mild relief. He was also referred to PT and prescribed diclofenac and Tramadol. Cervical spine MRI dated 10/29/19 reviewed. Per report, C3-4: Mild disc bulging and moderate left facet joint arthrosis moderately narrowing the left neural foramen. C4-5: Left disc protrusion and uncovertebral joint hypertrophy narrowing the neural canal and left neural foramen. C5-6: large left disc protrusion and severe left uncovertebral joint hypertrophy narrowing the neural canal and left neural foramen. C6-7: Moderate disc extrusion mildly narrowing the neural canal. He works as a .     Darlyn Millan comes in to the office today for f/u. He went to see [facility] for cervical KRAIG (Left C5-6 on 3/3/2020 with 85% relief for a few weeks; left C5-6 TFESI with 100% benefit on 4/17/2020until 6/13/2020). He also took Gabapentin and Tramadol. He then had a left cervical MBB with 50% benefit, so he proceeded with the RFA. However, he feels as if after the RFA, he has had pain in the neck and upper back with cervical flexion. His wife has noticed that his head sits lower/more forward than previously. Pain Score: 7/10. ASSESSMENT  This is a 60year-old male with history of neck pain radiating into the LUE. His symptoms are likely due to a left C4/5/6 radiculopathy as evidenced on his MRI. On examination, he had a positive Spurling's sign on the left. PLAN  1. Referral to Dr. John Rodriguez for surgical consult. Pt will f/u with Dr. John Rodriguez or sooner as needed. Diagnoses and all orders for this visit:    1. Cervical radiculopathy  -     REFERRAL TO SPINE SURGERY         PAST MEDICAL HISTORY   Past Medical History:   Diagnosis Date    Chronic low back pain     Degeneration of lumbar intervertebral disc     Diabetes ([address]     Diabetes mellitus ([address]     Essential hypertension     Hyperlipidemia     Hypertension     Shoulder pain        Past Surgical History:   Procedure Laterality Date    HX COLONOSCOPY      HX HERNIA REPAIR      HX OTHER SURGICAL      knee surgery   . MEDICATIONS    Current Outpatient Medications   Medication Sig Dispense Refill    glucose blood VI test strips (BLOOD GLUCOSE TEST) strip Use to test blood sugars  daily as directed      Blood-Glucose Meter (ONETOUCH VERIO IQ METER) monitoring kit Use to test blood sugars twice daily as directed      blood sugar diagnostic (CONTOUR NEXT TEST STRIPS) Contour Next Test Strips   Check blood glucose bid Diagnosis: DM (250.00)      telmisartan (MICARDIS) 80 mg tablet Take 80 mg by mouth daily.       finasteride (PROSCAR) 5 mg tablet Take 5 mg by mouth daily.  cyclobenzaprine HCl (FLEXERIL PO) Take 5 mg by mouth three (3) times daily as needed.  glimepiride (AMARYL) 2 mg tablet Take  by mouth every morning.  diclofenac EC (VOLTAREN) 75 mg EC tablet Take  by mouth.  amlodipine besylate (AMLODIPINE PO) Take  by mouth.  tamsulosin (FLOMAX) 0.4 mg capsule Take 1 Cap by mouth daily (after dinner). 90 Cap 3    levoFLOXacin (LEVAQUIN) 750 mg tablet Take 1 Tab by mouth daily. 1 Tab 0    baclofen (LIORESAL) 10 mg tablet Take  by mouth three (3) times daily.  tadalafil (CIALIS) 5 mg tablet Take 5 mg by mouth.  gabapentin (NEURONTIN) 300 mg capsule Take 300 mg by mouth three (3) times daily.  hydroCHLOROthiazide (HYDRODIURIL) 25 mg tablet Take 25 mg by mouth daily.  HYDROcodone-acetaminophen (NORCO) 5-325 mg per tablet Take  by mouth.  metFORMIN (GLUCOPHAGE) 500 mg tablet Take  by mouth two (2) times daily (with meals).  predniSONE (DELTASONE) 10 mg tablet Take  by mouth daily (with breakfast).  simvastatin (ZOCOR) 10 mg tablet Take  by mouth nightly.  traMADol (ULTRAM) 50 mg tablet Take 50 mg by mouth every six (6) hours as needed for Pain.  traMADol (ULTRAM) 50 mg tablet TAKE ONE TABLET BY MOUTH THREE TIMES A DAY AS NEEDED FOR PAIN 90 Tab 0    aspirin 81 mg chewable tablet Take 81 mg by mouth daily.       baclofen (LIORESAL) 10 mg tablet   0    gabapentin (NEURONTIN) 300 mg capsule   1    hydrochlorothiazide (HYDRODIURIL) 25 mg tablet   2    metFORMIN (GLUCOPHAGE) 500 mg tablet   1    simvastatin (ZOCOR) 10 mg tablet   1        ALLERGIES  Allergies   Allergen Reactions    Lisinopril Cough          SOCIAL HISTORY    Social History     Socioeconomic History    Marital status: UNKNOWN     Spouse name: Not on file    Number of children: Not on file    Years of education: Not on file    Highest education level: Not on file   Tobacco Use    Smoking status: Never Smoker  Smokeless tobacco: Never Used   Substance and Sexual Activity    Alcohol use: No     Alcohol/week: 0.0 standard drinks    Drug use: No    Sexual activity: Not Currently   Social History Narrative    ** Merged History Encounter **            FAMILY HISTORY  Family History   Problem Relation Age of Onset    Hypertension Mother     Diabetes Father     Heart Failure Father     Hypertension Father          REVIEW OF SYSTEMS  Review of Systems   Musculoskeletal: Positive for neck pain. Left shoulder paraesthesia           PHYSICAL EXAMINATION  There were no vitals taken for this visit. Pain Assessment  1/16/2020   Location of Pain Neck   Severity of Pain 7   Quality of Pain Throbbing   Quality of Pain Comment \"tingling on left shoulder\"   Frequency of Pain Intermittent   Result of Injury No           Constitutional:  Well developed, well nourished, in no acute distress. Psychiatric: Affect and mood are appropriate. Integumentary: No rashes or abrasions noted on exposed areas. SPINE/MUSCULOSKELETAL EXAM    Cervical spine:  Neck is midline. Normal muscle tone. No focal atrophy is noted. ROM pain free. Shoulder ROM intact. No tenderness to palpation. Positive Spurling's sign on the left. Negative Tinel's sign. Negative Connor's sign. Sensation in the bilateral arms grossly intact to light touch. Updates 8/25/2020:  Cervical flexion  Tenderness to palpation of cervical paraspinals     MOTOR:      Biceps  Triceps Deltoids Wrist Ext Wrist Flex Hand Intrin   Right 5/5 5/5 5/5 5/5 5/5 5/5   Left 5/5 5/5 5/5 5/5 5/5 5/5             Hip Flex  Quads Hamstrings Ankle DF EHL Ankle PF   Right 5/5 5/5 5/5 5/5 5/5 5/5   Left 5/5 5/5 5/5 5/5 5/5 5/5     DTRs are 2+ biceps, triceps, brachioradialis, patella, and Achilles.     Negative Straight Leg raise.    Squat not tested. No difficulty with tandem gait.      Ambulation without assistive device. FWB.       RADIOGRAPHS  Cervical MRI images taken on 10/25/19 personally reviewed with patient:  Alignment: Within normal limits. Vertebral bodies: No compression fractures. Spinal cord: Deformed in shape by degenerative disc disease. No evident intrinsic spinal cord abnormality. Craniocervical junction: Within normal limits. C1-2: Within normal limits. C3-4:Normal disc height. Moderate disc desiccation. Mild disc bulging and bilateral uncovertebral joint hypertrophy. Moderate left facet joint arthrosis and neural foraminal stenosis. Anterior-posterior dimension of the thecal sac at the midline measures 11 mm. C4-5: Moderate disc narrowing and desiccation. Diffuse disc bulging with accompanying osteophytes. Moderate left disc protrusion. Mild-moderate right and moderately severe left uncovertebral joint hypertrophy and neural foraminal stenosis. Anterior-posterior dimension of the thecal sac at the midline measures 9.5 mm. C5-6: Moderate disc narrowing and desiccation. Moderately large left disc protrusion with accompanying osteophytes and severe left uncovertebral joint hypertrophy mild-moderately narrowing the neural canal and severely narrowing the left neural foramen. Moderate right disc protrusion with accompanying osteophytes and moderate right uncovertebral joint hypertrophy. C6-7: Mild disc narrowing and desiccation. Moderate central disc extrusion, eccentric slightly to the right of midline. Mild right uncovertebral joint hypertrophy and neural foraminal stenosis. Anterior-posterior dimension if the thecal sac at the midline measures 9 mm. Other levels show normal disc height and hydration, no disc protrusion, normal appearance of the facet joints, and no nerve root impingement.    Upper thoracic spine: Within normal limits.     IMPRESSION:  C3-4: Mild disc bulging and moderate left facet joint arthrosis moderately narrowing the left neural foramen. C4-5: Left disc protrusion and uncovertebral joint hypertrophy narrowing the neural canal and left neural foramen. C5-6: large left disc protrusion and severe left uncovertebral joint hypertrophy narrowing the neural canal and left neural foramen. C6-7: Moderate disc extrusion mildly narrowing the neural canal.     Cervical XR images taken on 8/16/19 personally reviewed with patient:  AP, lateral,  both oblique, and odontoid views are obtained. Disc  space narrowing is present at the C4-C5, C5-C6 and C6-C7 levels. Discal spurring  is present at all levels. No significant neural foraminal narrowing is seen. There is no fracture or subluxation. Prevertebral soft tissues and predental  space are normal.     IMPRESSION  Impression:      Multilevel degenerative disc disease and osteoarthritic changes. No acute  Abnormalities. 15 minutes of face-to-face contact were spent with the patient during today's visit extensively discussing symptoms and treatment plan. All questions were answered. More than half of this visit today was spent on counseling.      Written by Lulú Ludwig as dictated by Ellen Jansen MD acetaminophen 500 mg oral tablet: 2 tab(s) orally every 6 hours as needed for pain  bacitracin 500 units/g topical ointment: 1 Apply topically to affected area once a day  chlorhexidine 0.12% mucous membrane liquid: 15 milliliter(s) orally 2 times a day  fluticasone 50 mcg/inh nasal spray: 1 spray(s) nasal 2 times a day  sodium chloride 0.65% nasal spray: 2 spray(s) nasal 3 times a day   acetaminophen 500 mg oral tablet: 2 tab(s) orally every 6 hours as needed for pain  bacitracin 500 units/g topical ointment: 1 Apply topically to affected area once a day  chlorhexidine 0.12% mucous membrane liquid: 15 milliliter(s) orally 2 times a day  sodium chloride 0.65% nasal spray: 2 spray(s) nasal 3 times a day

## 2025-06-16 NOTE — PROGRESS NOTE ADULT - REASON FOR ADMISSION
Encouraged to get fasting labs done.   Try a tricor/fenofibrate if still not improved.   Diet advised and printed out for patient to help with lowering triglycerides. Advised him that uncontrolled elevated triglycerides may irritate the pancreas. He does not drink alcohol.   He stopped amnesteem medication a while ago, it may have been the cause of the high TG.     
S/P Fall from standing
SAH / SDH

## 2025-06-16 NOTE — DISCHARGE NOTE PROVIDER - NSDCFUADDAPPT_GEN_ALL_CORE_FT
Trauma therapy support To learn more, or to make an appointment,  please call   [phone].    [facility] 24/7 hotline for mental health, substance abuse-[phone]    APPTS ARE READY TO BE MADE: [X] YES    Best Family or Patient Contact (if needed):    Additional Information about above appointments (if needed):    1: Neurosurgery clinic in 10-14 days for re-evaluation  2: ENT clinic for next Monday 6/23/25  3: Plastic surgery clinic in 7-10 days    Other comments or requests:        Trauma therapy support To learn more, or to make an appointment,  please call   [phone].    [facility] 24/7 hotline for mental health, substance abuse-[phone]    APPTS ARE READY TO BE MADE: [X] YES    Best Family or Patient Contact (if needed):    Additional Information about above appointments (if needed):    1: Neurosurgery clinic in 10-14 days for re-evaluation  2: ENT clinic for next Monday 6/23/25  3: Plastic surgery clinic in 7-10 days    Other comments or requests:     Patient informed us they already have secured a follow up appointment which is not visible on [facility] and declined to provide appointment details. Patient is scheduled with Dr. Beauchamp 7/2 9:15am [address] [facility]    Provided patient with provider referral information, however patient prefers to schedule the appointments on their own.

## 2025-06-16 NOTE — DISCHARGE NOTE NURSING/CASE MANAGEMENT/SOCIAL WORK - NSDCFUADDAPPT_GEN_ALL_CORE_FT
Trauma therapy support To learn more, or to make an appointment,  please call   [phone].    [facility] 24/7 hotline for mental health, substance abuse-[phone]

## 2025-06-16 NOTE — PROGRESS NOTE ADULT - PROBLEM SELECTOR PLAN 1
-Plan for DC home with follow up in our office this Wednesday 6/18 at 1:15 pm  -Use nasal saline 2 sprays to both nares Q2 hours   -No nose blowing    Pt will return for Surgery on Monday 6/23. Will plan for phone PST

## 2025-06-16 NOTE — DISCHARGE NOTE PROVIDER - PROVIDER TOKENS
PROVIDER:[TOKEN:[[id number]]],PROVIDER:[TOKEN:[[id number]]],PROVIDER:[TOKEN:[[id number]]]

## 2025-06-16 NOTE — CHART NOTE - NSCHARTNOTEFT_GEN_A_CORE
Consult received for MST Score = />2. Upon chart review, pt does not currently meet criteria for protein calorie malnutrition risk per MST. On easy to chew diet secondary to facial and lip lacerations. Aware pt downgraded from ICU, awaiting transfer to bed on 3TWR, 2BRK, or 2GUL. RD to follow up with full nutrition assessment per medical floor protocol.

## 2025-06-16 NOTE — DISCHARGE NOTE NURSING/CASE MANAGEMENT/SOCIAL WORK - PATIENT PORTAL LINK FT
You can access the FollowMyHealth Patient Portal offered by [facility] by registering at the following website: [email or web address]. By joining [facility]’s FollowMyHealth portal, you will also be able to view your health information using other applications (apps) compatible with our system.

## 2025-06-16 NOTE — PHYSICAL THERAPY INITIAL EVALUATION ADULT - PERTINENT HX OF CURRENT PROBLEM, REHAB EVAL
39y Male presenting to ED 6/14 AM as a trauma A after [place], facial trauma, intubated, admitted to SICU. PMH of chronic recurrent sinus infections, Endorses intranasal drug use on evening of 6/13. CT maxillofacial read as "likely septal hematoma" in setting of bilateral nasal bone fractures, ENT consulted, reports pt "Will likely require OR for closed reduction of b/l nasal bone fx" however, "No indication for acute surgical intervention" CTH shows SDH and SAH along left anterior frontal lobe, CT Spine, CTA head unremarkable. Pt now extubated, evaluated in SICU.

## 2025-06-16 NOTE — PROGRESS NOTE ADULT - SUBJECTIVE AND OBJECTIVE BOX
ENT ISSUE/POD: nasal bone fracture     Interval HPI: Pt awake, pending downgrade/discharge to home         PAST MEDICAL & SURGICAL HISTORY:    Allergies    Allergy Status Unknown    Intolerances      MEDICATIONS  (STANDING):  bacitracin   Ointment 1 Application(s) Topical daily  chlorhexidine 2% Cloths 1 Application(s) Topical daily  enoxaparin Injectable 30 milliGRAM(s) SubCutaneous every 12 hours  FIRST- Mouthwash  BLM 10 milliLiter(s) Swish and Spit three times a day  fluticasone propionate 50 MICROgram(s)/spray Nasal Spray 1 Spray(s) Both Nostrils two times a day  melatonin 5 milliGRAM(s) Oral at bedtime  oxymetazoline 0.05% Nasal Spray 1 Spray(s) Both Nostrils two times a day  pantoprazole  Injectable 40 milliGRAM(s) IV Push daily  polyethylene glycol 3350 17 Gram(s) Oral daily  senna 2 Tablet(s) Oral at bedtime    MEDICATIONS  (PRN):  acetaminophen   IVPB .. 1000 milliGRAM(s) IV Intermittent every 6 hours PRN Temp greater or equal to 38C (100.4F), Mild Pain (1 - 3)  HYDROmorphone  Injectable 0.5 milliGRAM(s) IV Push every 6 hours PRN breakthrough  oxyCODONE    IR 5 milliGRAM(s) Oral every 6 hours PRN Moderate Pain (4 - 6)  oxyCODONE    IR 10 milliGRAM(s) Oral every 6 hours PRN Severe Pain (7 - 10)           ROS:   ENT: all negative except as noted in HPI   Pulm: denies SOB, cough, hemoptysis  Neuro: denies numbness/tingling, loss of sensation  Endo: denies heat/cold intolerance, excessive sweating      Vital Signs Last 24 Hrs  T(C): 37.5 (16 Jun 2025 11:40), Max: 37.5 (16 Jun 2025 11:40)  T(F): 99.5 (16 Jun 2025 11:40), Max: 99.5 (16 Jun 2025 11:40)  HR: 77 (16 Jun 2025 12:00) (51 - 92)  BP: 126/88 (16 Jun 2025 12:00) (102/59 - 141/76)  BP(mean): 99 (16 Jun 2025 12:00) (72 - 99)  RR: 22 (16 Jun 2025 12:00) (13 - 23)  SpO2: 99% (16 Jun 2025 12:00) (94% - 99%)    Parameters below as of 16 Jun 2025 08:00  Patient On (Oxygen Delivery Method): room air                              11.3   9.29  )-----------( 185      ( 16 Jun 2025 05:20 )             34.6    06-16    138  |  103  |  8.8  ----------------------------<  87  4.1   |  22.0  |  0.78    Ca    8.8      16 Jun 2025 05:20  Phos  2.0     06-16  Mg     2.0     06-16    TPro  6.6  /  Alb  3.5  /  TBili  0.7  /  DBili  0.2  /  AST  25  /  ALT  14  /  AlkPhos  39[L]  06-16       PHYSICAL EXAM:  Gen: NAD  Head: Normocephalic, Atraumatic  Face: no edema, erythema, or fluctuance. Parotid glands soft without mass  Eyes: no scleral injection, b/l infraorbital ecchymosis   Nose: swollen nasal bridge, no septal hematoma   Mouth: No Stridor / Drooling / Trismus.  Mucosa moist, tongue/uvula midline, broken R lateral incisor, granulating lower lip laceration, no bleeding, small R upper lip avulsion w granulation   Neck: Flat, supple, no lymphadenopathy, trachea midline, no masses  Lymphatic: No lymphadenopathy  Resp: breathing easily, no stridor  Neuro: facial nerve intact, no facial droop

## 2025-06-16 NOTE — OCCUPATIONAL THERAPY INITIAL EVALUATION ADULT - FINE MOTOR COORDINATION, LEFT HAND, MANIPULATION OF OBJECTS, OT EVAL
Report rcd from [facility] at this time, pt ao x 3,sw/d,resp e/u, c/o 4/10 chest pain, but no other c/o at this time.   normal performance

## 2025-06-16 NOTE — DISCHARGE NOTE NURSING/CASE MANAGEMENT/SOCIAL WORK - NSDCVIVACCINE_GEN_ALL_CORE_FT
Tdap; 14-Jun-2025 06:50; Aicha Rao (RN); [employer]; [id number] (Exp. Date: 01-Apr-2026); IntraMuscular; Deltoid Left.; 0.5 milliLiter(s); VIS (VIS Published: 09-May-2013, VIS Presented: 14-Jun-2025);

## 2025-06-16 NOTE — PHYSICAL THERAPY INITIAL EVALUATION ADULT - ADDITIONAL COMMENTS
Pt lives in a private home with his partner. 2 steps to enter with handrails, no steps inside. Pt was independent PTA without an assist device. Pt owns no DME.

## 2025-06-16 NOTE — CHART NOTE - NSCHARTNOTESELECT_GEN_ALL_CORE
Neurosurgery Note/Event Note
DOWNGRADE NOTE/Transfer Note
Downgrade Acceptance/Event Note
EEG Delay/Event Note
Nutrition Services
Tertiary Survey/Event Note

## 2025-06-16 NOTE — DISCHARGE NOTE PROVIDER - CARE PROVIDERS DIRECT ADDRESSES
[email or web address]

## 2025-06-16 NOTE — DISCHARGE NOTE PROVIDER - HOSPITAL COURSE
An approximately 28 yo male was found down on [address] with obvious head trauma. EMS reports that he was unresponsive and therefore intubated in the field. He was given 100mg of IV ketamine and 100mg of IV rocuronium for intubation. EMS reports bradycardia but was otherwise hemodynamically well during transportation.  Pt presenting to ED BIBEMS after being found [place] [facility] on [address]. EMS states that surveillance footage demonstrated possible seizure like activity. Neurosurgery consulted in light of CT head findings from imaging performed on 6/14/2025 demonstrating "0.3 cm acute subdural hematoma as well as small volume subarachnoid hemorrhage along the left anterior frontal lobe, as well as possible punctate intraparenchymal hemorrhage in the left anterior frontal lobe". CT C-spine also performed 6/14 demonstrates "no acute cervical fracture or traumatic malalignment". CTA of head and neck revealed "no hemodynamically significant stenosis/injury" and "no significant vascular lesion/injury". Pt was admitted to SICU. EEG without epileptiform activity. Pt repeat Head CT remained stable and pt was successfully extubated. Head CT of Max / face revealed a comminuted nasal bone fractures with associated overlying facial lacerations and subcutaneous emphysema. Likely nasal septal hematoma. ENT was consulted and pt endorsed intranasal drug use on evening of 6/13. ENT states that there was indication for acute surgical intervention until swelling subsides. Patient will follow-up next Monday with ENT clinic for closed reduction vs open surgery. ENT and Plastic surgery consultant evaluated lip laceration and state since on mucosal surface to use Peridex and allow to heal by secondary intention. Patient remained with pain controlled with multimodal pain regimen. Physical therapy evaluated patient and cleared for discharge home. Pt remained with stable vitals and afebrile and was tolerating diet with bowel function at time of discharge.

## 2025-06-16 NOTE — PROGRESS NOTE ADULT - SUBJECTIVE AND OBJECTIVE BOX
INTERVAL HPI/OVERNIGHT EVENTS:      SUBJECTIVE:    ICU Vital Signs Last 24 Hrs  T(C): 37.5 (16 Jun 2025 11:40), Max: 37.5 (16 Jun 2025 11:40)  T(F): 99.5 (16 Jun 2025 11:40), Max: 99.5 (16 Jun 2025 11:40)  HR: 77 (16 Jun 2025 12:00) (51 - 100)  BP: 126/88 (16 Jun 2025 12:00) (102/59 - 141/76)  BP(mean): 99 (16 Jun 2025 12:00) (72 - 99)  ABP: --  ABP(mean): --  RR: 22 (16 Jun 2025 12:00) (13 - 23)  SpO2: 99% (16 Jun 2025 12:00) (94% - 99%)    O2 Parameters below as of 16 Jun 2025 08:00  Patient On (Oxygen Delivery Method): room air          I&O's Detail    15 Gopi 2025 07:01  -  16 Jun 2025 07:00  --------------------------------------------------------  IN:    IV PiggyBack: 75 mL    IV PiggyBack: 100 mL    Oral Fluid: 700 mL  Total IN: 875 mL    OUT:    Voided (mL): 1600 mL  Total OUT: 1600 mL    Total NET: -725 mL      16 Jun 2025 07:01  -  16 Jun 2025 12:21  --------------------------------------------------------  IN:    Oral Fluid: 600 mL  Total IN: 600 mL    OUT:  Total OUT: 0 mL    Total NET: 600 mL          MEDICATIONS  (STANDING):  bacitracin   Ointment 1 Application(s) Topical daily  chlorhexidine 2% Cloths 1 Application(s) Topical daily  enoxaparin Injectable 30 milliGRAM(s) SubCutaneous every 12 hours  FIRST- Mouthwash  BLM 10 milliLiter(s) Swish and Spit three times a day  fluticasone propionate 50 MICROgram(s)/spray Nasal Spray 1 Spray(s) Both Nostrils two times a day  melatonin 5 milliGRAM(s) Oral at bedtime  oxymetazoline 0.05% Nasal Spray 1 Spray(s) Both Nostrils two times a day  pantoprazole  Injectable 40 milliGRAM(s) IV Push daily  polyethylene glycol 3350 17 Gram(s) Oral daily  senna 2 Tablet(s) Oral at bedtime    MEDICATIONS  (PRN):  acetaminophen   IVPB .. 1000 milliGRAM(s) IV Intermittent every 6 hours PRN Temp greater or equal to 38C (100.4F), Mild Pain (1 - 3)  HYDROmorphone  Injectable 0.5 milliGRAM(s) IV Push every 6 hours PRN breakthrough  oxyCODONE    IR 5 milliGRAM(s) Oral every 6 hours PRN Moderate Pain (4 - 6)  oxyCODONE    IR 10 milliGRAM(s) Oral every 6 hours PRN Severe Pain (7 - 10)    Drug Dosing Weight  Height (cm): 180.3 (14 Jun 2025 07:45)  Weight (kg): 86.2 (14 Jun 2025 09:27)  BMI (kg/m2): 26.5 (14 Jun 2025 09:27)  BSA (m2): 2.06 (14 Jun 2025 09:27)    LABS:    CBC Full  -  ( 16 Jun 2025 05:20 )  WBC Count : 9.29 K/uL  RBC Count : 3.85 M/uL  Hemoglobin : 11.3 g/dL  Hematocrit : 34.6 %  Platelet Count - Automated : 185 K/uL  Mean Cell Volume : 89.9 fl  Mean Cell Hemoglobin : 29.4 pg  Mean Cell Hemoglobin Concentration : 32.7 g/dL  Auto Neutrophil # : 6.45 K/uL  Auto Lymphocyte # : 1.81 K/uL  Auto Monocyte # : 0.79 K/uL  Auto Eosinophil # : 0.16 K/uL  Auto Basophil # : 0.03 K/uL  Auto Neutrophil % : 69.5 %  Auto Lymphocyte % : 19.5 %  Auto Monocyte % : 8.5 %  Auto Eosinophil % : 1.7 %  Auto Basophil % : 0.3 %    06-16    138  |  103  |  8.8  ----------------------------<  87  4.1   |  22.0  |  0.78    Ca    8.8      16 Jun 2025 05:20  Phos  2.0     06-16  Mg     2.0     06-16    TPro  6.6  /  Alb  3.5  /  TBili  0.7  /  DBili  0.2  /  AST  25  /  ALT  14  /  AlkPhos  39[L]  06-16      Urinalysis Basic - ( 16 Jun 2025 05:20 )    Color: x / Appearance: x / SG: x / pH: x  Gluc: 87 mg/dL / Ketone: x  / Bili: x / Urobili: x   Blood: x / Protein: x / Nitrite: x   Leuk Esterase: x / RBC: x / WBC x   Sq Epi: x / Non Sq Epi: x / Bacteria: x        Physical Exam:      PATIENT CARE ACCESS DEVICES:  [ x] Peripheral IV  [ ] Central Venous Line	[ ] R	[ ] L	[ ] IJ	[ ] Fem	[ ] SC	   [ ] Arterial Line		[ ] R	[ ] L	[ ] Fem	[ ] Rad	[ ] Ax	   [ ] PICC:					[ ] Mediport  [ ] Urinary Catheter:   [ ] Necessity of urinary, arterial, and venous catheters discussed    ASSESSMENT:  39 year old male who was found down on [facility] and was admitted to [facility] on 6/14/25 after a fall where he sustained a traumatic subdural hematoma and traumatic subarachnoid hematoma, comminuted nasal bone fracture and a septal hematoma, soft tissue avulsion of lip, L eyebrow laceration and mesenteric haziness on CT scan.  Unresponsive initially requiring intubation.    PLAN:      NEURO:  Oxycodone, CIWA, repeat ct scans stable - NSGY team signed off  PUL:  Breathing comfortably on RA  CVS:  HD stable  GI:  Soft chew diet, bowel regimen  :  Voiding, Cr stable  ID:  WBC normal, low grade temp  HEME:  H/H stable, scds, lovenox  LINES:  None  FLUIDS:  None      Pending downgrade to foor status       INTERVAL HPI/OVERNIGHT EVENTS:  Extubated 6/14.  Anxious and tremulous -improved w/valium -(possible withdrawal)    SUBJECTIVE:  Reports feeling 'ok'    ICU Vital Signs Last 24 Hrs  T(C): 37.5 (16 Jun 2025 11:40), Max: 37.5 (16 Jun 2025 11:40)  T(F): 99.5 (16 Jun 2025 11:40), Max: 99.5 (16 Jun 2025 11:40)  HR: 77 (16 Jun 2025 12:00) (51 - 100)  BP: 126/88 (16 Jun 2025 12:00) (102/59 - 141/76)  BP(mean): 99 (16 Jun 2025 12:00) (72 - 99)  ABP: --  ABP(mean): --  RR: 22 (16 Jun 2025 12:00) (13 - 23)  SpO2: 99% (16 Jun 2025 12:00) (94% - 99%)    O2 Parameters below as of 16 Jun 2025 08:00  Patient On (Oxygen Delivery Method): room air    I&O's Detail    15 Gopi 2025 07:01  -  16 Jun 2025 07:00  --------------------------------------------------------  IN:    IV PiggyBack: 75 mL    IV PiggyBack: 100 mL    Oral Fluid: 700 mL  Total IN: 875 mL    OUT:    Voided (mL): 1600 mL  Total OUT: 1600 mL    Total NET: -725 mL      16 Jun 2025 07:01  -  16 Jun 2025 12:21  --------------------------------------------------------  IN:    Oral Fluid: 600 mL  Total IN: 600 mL    OUT:  Total OUT: 0 mL    Total NET: 600 mL    MEDICATIONS  (STANDING):  bacitracin   Ointment 1 Application(s) Topical daily  chlorhexidine 2% Cloths 1 Application(s) Topical daily  enoxaparin Injectable 30 milliGRAM(s) SubCutaneous every 12 hours  FIRST- Mouthwash  BLM 10 milliLiter(s) Swish and Spit three times a day  fluticasone propionate 50 MICROgram(s)/spray Nasal Spray 1 Spray(s) Both Nostrils two times a day  melatonin 5 milliGRAM(s) Oral at bedtime  oxymetazoline 0.05% Nasal Spray 1 Spray(s) Both Nostrils two times a day  pantoprazole  Injectable 40 milliGRAM(s) IV Push daily  polyethylene glycol 3350 17 Gram(s) Oral daily  senna 2 Tablet(s) Oral at bedtime    MEDICATIONS  (PRN):  acetaminophen   IVPB .. 1000 milliGRAM(s) IV Intermittent every 6 hours PRN Temp greater or equal to 38C (100.4F), Mild Pain (1 - 3)  HYDROmorphone  Injectable 0.5 milliGRAM(s) IV Push every 6 hours PRN breakthrough  oxyCODONE    IR 5 milliGRAM(s) Oral every 6 hours PRN Moderate Pain (4 - 6)  oxyCODONE    IR 10 milliGRAM(s) Oral every 6 hours PRN Severe Pain (7 - 10)    Drug Dosing Weight  Height (cm): 180.3 (14 Jun 2025 07:45)  Weight (kg): 86.2 (14 Jun 2025 09:27)  BMI (kg/m2): 26.5 (14 Jun 2025 09:27)  BSA (m2): 2.06 (14 Jun 2025 09:27)    LABS:    CBC Full  -  ( 16 Jun 2025 05:20 )  WBC Count : 9.29 K/uL  RBC Count : 3.85 M/uL  Hemoglobin : 11.3 g/dL  Hematocrit : 34.6 %  Platelet Count - Automated : 185 K/uL  Mean Cell Volume : 89.9 fl  Mean Cell Hemoglobin : 29.4 pg  Mean Cell Hemoglobin Concentration : 32.7 g/dL  Auto Neutrophil # : 6.45 K/uL  Auto Lymphocyte # : 1.81 K/uL  Auto Monocyte # : 0.79 K/uL  Auto Eosinophil # : 0.16 K/uL  Auto Basophil # : 0.03 K/uL  Auto Neutrophil % : 69.5 %  Auto Lymphocyte % : 19.5 %  Auto Monocyte % : 8.5 %  Auto Eosinophil % : 1.7 %  Auto Basophil % : 0.3 %    06-16    138  |  103  |  8.8  ----------------------------<  87  4.1   |  22.0  |  0.78    Ca    8.8      16 Jun 2025 05:20  Phos  2.0     06-16  Mg     2.0     06-16    TPro  6.6  /  Alb  3.5  /  TBili  0.7  /  DBili  0.2  /  AST  25  /  ALT  14  /  AlkPhos  39[L]  06-16      Urinalysis Basic - ( 16 Jun 2025 05:20 )    Color: x / Appearance: x / SG: x / pH: x  Gluc: 87 mg/dL / Ketone: x  / Bili: x / Urobili: x   Blood: x / Protein: x / Nitrite: x   Leuk Esterase: x / RBC: x / WBC x   Sq Epi: x / Non Sq Epi: x / Bacteria: x    Physical Exam:  General:  Comfortable  NEUROLOGIC:  GCS 15, awake, alert, conversant, moves all extremities without gross deficits.  Oriented to person, place and time.  HEENT:  Scattered abrasions across forehead and b/l eyebrows, nose is swollen w/superficial abrasions, inner lower lip (mucosa) w/an extensive stellate laceration w/some granulation and superficial necrosis of some edges, superior inner lip (at mucosa) w/laceration, swelling  NECK:  Non tender, no step-offs  HEART:  Regular rate and rhythm  CHEST/LUNG: Clear breath sounds b/l  ABDOMEN: Soft, non tender, non distended  EXTREMITIES:  Warm, no edema  SKIN:  Intact  BACK:  Non tender, no step-offs    PATIENT CARE ACCESS DEVICES:  [ x] Peripheral IV  [ ] Central Venous Line	[ ] R	[ ] L	[ ] IJ	[ ] Fem	[ ] SC	   [ ] Arterial Line		[ ] R	[ ] L	[ ] Fem	[ ] Rad	[ ] Ax	   [ ] PICC:					[ ] Mediport  [ ] Urinary Catheter:   [ ] Necessity of urinary, arterial, and venous catheters discussed    ASSESSMENT:  39 year old male who was found down on [facility] and was admitted to [facility] on 6/14/25 after a fall where he sustained a traumatic subdural hematoma and traumatic subarachnoid hematoma, comminuted nasal bone fracture and a septal hematoma, soft tissue avulsion of lip, L eyebrow laceration and mesenteric haziness on CT scan.  Unresponsive initially requiring intubation.    PLAN:      NEURO:  Oxycodone, CIWA, repeat ct scans stable - NSGY team signed off  PUL:  Breathing comfortably on RA  CVS:  HD stable  GI:  Soft chew diet, bowel regimen  :  Voiding, Cr stable  ID:  WBC normal, low grade temp  HEME:  H/H stable, scds, lovenox  FEN:  Replete phos  ENT to follow up  Bacitracin to skin abrasions  Discussed w/plastics w/ Dr. Yong Barrow regarding inner upper/lower lip management - suggested to use peridex daily and follow up in one week.  LINES:  None  FLUIDS:  None    Discussed above w/patient and his mother and partner at length at bedside.  Answered questions.    No acute critical care issues at this time.  Will transfer from critical care setting.  If cleared by ENT patient can be discharged to home.       INTERVAL HPI/OVERNIGHT EVENTS:  Extubated 6/14.  Anxious and tremulous -improved w/valium -(possible withdrawal)    SUBJECTIVE:  Reports feeling 'ok'    ICU Vital Signs Last 24 Hrs  T(C): 37.5 (16 Jun 2025 11:40), Max: 37.5 (16 Jun 2025 11:40)  T(F): 99.5 (16 Jun 2025 11:40), Max: 99.5 (16 Jun 2025 11:40)  HR: 77 (16 Jun 2025 12:00) (51 - 100)  BP: 126/88 (16 Jun 2025 12:00) (102/59 - 141/76)  BP(mean): 99 (16 Jun 2025 12:00) (72 - 99)  ABP: --  ABP(mean): --  RR: 22 (16 Jun 2025 12:00) (13 - 23)  SpO2: 99% (16 Jun 2025 12:00) (94% - 99%)    O2 Parameters below as of 16 Jun 2025 08:00  Patient On (Oxygen Delivery Method): room air    I&O's Detail    15 Gopi 2025 07:01  -  16 Jun 2025 07:00  --------------------------------------------------------  IN:    IV PiggyBack: 75 mL    IV PiggyBack: 100 mL    Oral Fluid: 700 mL  Total IN: 875 mL    OUT:    Voided (mL): 1600 mL  Total OUT: 1600 mL    Total NET: -725 mL      16 Jun 2025 07:01  -  16 Jun 2025 12:21  --------------------------------------------------------  IN:    Oral Fluid: 600 mL  Total IN: 600 mL    OUT:  Total OUT: 0 mL    Total NET: 600 mL    MEDICATIONS  (STANDING):  bacitracin   Ointment 1 Application(s) Topical daily  chlorhexidine 2% Cloths 1 Application(s) Topical daily  enoxaparin Injectable 30 milliGRAM(s) SubCutaneous every 12 hours  FIRST- Mouthwash  BLM 10 milliLiter(s) Swish and Spit three times a day  fluticasone propionate 50 MICROgram(s)/spray Nasal Spray 1 Spray(s) Both Nostrils two times a day  melatonin 5 milliGRAM(s) Oral at bedtime  oxymetazoline 0.05% Nasal Spray 1 Spray(s) Both Nostrils two times a day  pantoprazole  Injectable 40 milliGRAM(s) IV Push daily  polyethylene glycol 3350 17 Gram(s) Oral daily  senna 2 Tablet(s) Oral at bedtime    MEDICATIONS  (PRN):  acetaminophen   IVPB .. 1000 milliGRAM(s) IV Intermittent every 6 hours PRN Temp greater or equal to 38C (100.4F), Mild Pain (1 - 3)  HYDROmorphone  Injectable 0.5 milliGRAM(s) IV Push every 6 hours PRN breakthrough  oxyCODONE    IR 5 milliGRAM(s) Oral every 6 hours PRN Moderate Pain (4 - 6)  oxyCODONE    IR 10 milliGRAM(s) Oral every 6 hours PRN Severe Pain (7 - 10)    Drug Dosing Weight  Height (cm): 180.3 (14 Jun 2025 07:45)  Weight (kg): 86.2 (14 Jun 2025 09:27)  BMI (kg/m2): 26.5 (14 Jun 2025 09:27)  BSA (m2): 2.06 (14 Jun 2025 09:27)    LABS:    CBC Full  -  ( 16 Jun 2025 05:20 )  WBC Count : 9.29 K/uL  RBC Count : 3.85 M/uL  Hemoglobin : 11.3 g/dL  Hematocrit : 34.6 %  Platelet Count - Automated : 185 K/uL  Mean Cell Volume : 89.9 fl  Mean Cell Hemoglobin : 29.4 pg  Mean Cell Hemoglobin Concentration : 32.7 g/dL  Auto Neutrophil # : 6.45 K/uL  Auto Lymphocyte # : 1.81 K/uL  Auto Monocyte # : 0.79 K/uL  Auto Eosinophil # : 0.16 K/uL  Auto Basophil # : 0.03 K/uL  Auto Neutrophil % : 69.5 %  Auto Lymphocyte % : 19.5 %  Auto Monocyte % : 8.5 %  Auto Eosinophil % : 1.7 %  Auto Basophil % : 0.3 %    06-16    138  |  103  |  8.8  ----------------------------<  87  4.1   |  22.0  |  0.78    Ca    8.8      16 Jun 2025 05:20  Phos  2.0     06-16  Mg     2.0     06-16    TPro  6.6  /  Alb  3.5  /  TBili  0.7  /  DBili  0.2  /  AST  25  /  ALT  14  /  AlkPhos  39[L]  06-16      Urinalysis Basic - ( 16 Jun 2025 05:20 )    Color: x / Appearance: x / SG: x / pH: x  Gluc: 87 mg/dL / Ketone: x  / Bili: x / Urobili: x   Blood: x / Protein: x / Nitrite: x   Leuk Esterase: x / RBC: x / WBC x   Sq Epi: x / Non Sq Epi: x / Bacteria: x    Physical Exam:  General:  Comfortable  NEUROLOGIC:  GCS 15, awake, alert, conversant, moves all extremities without gross deficits.  Oriented to person, place and time.  HEENT:  Scattered abrasions across forehead and b/l eyebrows, nose is swollen w/superficial abrasions, inner lower lip (mucosa) w/an extensive stellate laceration w/some granulation and superficial necrosis of some edges, superior inner lip (at mucosa) w/laceration, swelling  NECK:  Non tender, no step-offs  HEART:  Regular rate and rhythm  CHEST/LUNG: Clear breath sounds b/l  ABDOMEN: Soft, non tender, non distended  EXTREMITIES:  Warm, no edema  SKIN:  Intact  BACK:  Non tender, no step-offs    PATIENT CARE ACCESS DEVICES:  [ x] Peripheral IV  [ ] Central Venous Line	[ ] R	[ ] L	[ ] IJ	[ ] Fem	[ ] SC	   [ ] Arterial Line		[ ] R	[ ] L	[ ] Fem	[ ] Rad	[ ] Ax	   [ ] PICC:					[ ] Mediport  [ ] Urinary Catheter:   [ ] Necessity of urinary, arterial, and venous catheters discussed    ASSESSMENT:  39 year old male who was found down on [facility] and was admitted to [facility] on 6/14/25 after a fall where he sustained a traumatic subdural hematoma and traumatic subarachnoid hematoma, comminuted nasal bone fracture and a septal hematoma, soft tissue avulsion of lip, L eyebrow laceration and mesenteric haziness on CT scan.  Unresponsive initially requiring intubation.    PLAN:      NEURO:  Oxycodone, CIWA, repeat ct scans stable - NSGY team signed off  PUL:  Breathing comfortably on RA  CVS:  HD stable  GI:  Soft chew diet, bowel regimen  :  Voiding, Cr stable  ID:  WBC normal, low grade temp  HEME:  H/H stable, scds, lovenox  FEN:  Replete phos  ENT to follow up  Bacitracin to skin abrasions  Discussed w/plastics w/ Dr. Yong Barrow regarding inner upper/lower lip management - suggested to use peridex daily and follow up in one week.  LINES:  None  FLUIDS:  None    Discussed above w/patient and his mother and partner at length at bedside.  Answered questions.    No acute critical care issues at this time.  Will transfer from critical care setting.  If cleared by ENT patient can be discharged to home.    Attestation Statement:  I have personally and independently provided 45 minutes of non-continuous non-critical care services.  This excludes any time spent on separate procedures or teaching.

## 2025-06-16 NOTE — PHYSICAL THERAPY INITIAL EVALUATION ADULT - GENERAL OBSERVATIONS, REHAB EVAL
Pt received supine in bed + telemetry//BP. Pt c/o 6/10 face pain, apine meds received per RN, pt agreeable to PT

## 2025-06-16 NOTE — DISCHARGE NOTE NURSING/CASE MANAGEMENT/SOCIAL WORK - FINANCIAL ASSISTANCE
[facility] provides services at a reduced cost to those who are determined to be eligible through [facility]’s financial assistance program. Information regarding [facility]’s financial assistance program can be found by going to [email or web address] or by calling [phone].

## 2025-06-16 NOTE — DISCHARGE NOTE PROVIDER - NSDCCPCAREPLAN_GEN_ALL_CORE_FT
CHIEF COMPLAINT AND HISTORY OF PRESENT ILLNESS       Zamzam Agrawal is a 20 year old  female who  presents to the clinic today with following concerns     REVIEW OF SYSTEMS       The rest of the ROS is negative. No additional pertinent positives or negatives  other than as mentioned in history of present illness      PAST MEDICAL, SURGICAL, SOCIAL AND FAMILY HISTORY     There is no problem list on file for this patient.      I have reviewed the patient's medications and allergies, past medical, surgical and family history, updating these as appropriate. See histories section of the EMR for display of this information.    LABORATORIES  I have reviewed the pertinent laboratory tests.  These are the pertinent findings:    {Labs- Diabetic Labs:496806}  {Labs Super List:184661}    PHYSICAL EXAMINATION       Visit Vitals  LMP 05/20/2021     Wt Readings from Last 3 Encounters:   07/27/21 83 kg   04/09/21 99.5 kg     BP Readings from Last 4 Encounters:   07/27/21 125/81   04/09/21 110/68          Pulse Ox Interpretation:  {Pulse Ox Interpretation:4554212}  General:  {PE - General Appearance:9771616}  Skin:  Warm and dry without rash.    Head:  Normocephalic, atraumatic.   Neck:  Trachea is midline. No adenopathy.  Normal thyroid without mass or tenderness..   Eyes:  Normal conjunctivae and sclerae.  Pupils equal, round and reactive to light.  Extraocular movements intact.  ENT:  Mucous membranes are moist.  Normal tympanic membranes and external auditory canals bilaterally.  No pharyngeal erythema or exudate.  No facial tenderness.  Normal nasal mucosa.  Cardiovascular:  Symmetrical pulses.  Regular rate and rhythm without murmur.  Respiratory:  Normal respiratory effort.  Clear to auscultation.  No wheezes, rales or rhonchi.  Gastrointestinal:  Soft and non tender.  Normal bowel sounds.  No hepatomegaly or splenomegaly.   Musculoskeletal:  No deformity or edema.  No tenderness to palpation.  Back:   Normal alignment.   No costovertebral angle tenderness or midline bony tenderness.  Neurologic:   Oriented x4.  Cranial nerves II-XII are intact.  No focal deficits or lateralizing signs.  Psychiatric:   Cooperative.  Appropriate mood and affect.    ASSESSMENT AND PLAN   No diagnosis found.       PLAN:  No orders of the defined types were placed in this encounter.    No follow-ups on file.      All the above was discussed with the patient in detail; questions were answered to the patient's satisfaction.  Patient left the office in stable condition with verbal and written instructions.   PRINCIPAL DISCHARGE DIAGNOSIS  Diagnosis: Traumatic subdural hematoma with loss of consciousness  Assessment and Plan of Treatment: Please call and make an appointment with Neurosurgery Clinic in 10-14 days after discharge. Please call for an appointment with ENT clinic in next Monday, 6/23/25, for further management of fractures. Please call and make and appointment with plastic sugery clinic in 1 week. Also, please call and make an appointment with your primary care physician as per your usual schedule.   Activity: May return to normal activities as tolerated   Diet: May continue easy to chew diet.  Medications: Please take all home medications as prescribed by your primary care doctor.   Wound Care: Peridex rinse twice a day. Apply bacitracin to skin wounds but not to the mouth  If confusion, altered mental status, fever, chest pain, shortness of breath, severe or worsening pain, vomiting, change or worsening of medical status, please come back to the emergency room, and in case of emergency call 911      SECONDARY DISCHARGE DIAGNOSES  Diagnosis: Traumatic subarachnoid hematoma with loss of consciousness  Assessment and Plan of Treatment:     Diagnosis: Nasal bone fracture  Assessment and Plan of Treatment:     Diagnosis: Lip laceration  Assessment and Plan of Treatment:     Diagnosis: Polysubstance abuse  Assessment and Plan of Treatment:

## 2025-06-16 NOTE — DISCHARGE NOTE PROVIDER - CARE PROVIDER_API CALL
Mik Beauchamp  Head/Neck Surgery  [address], [address]  [place] [address]  Phone: [phone]  Fax: [phone]  Follow Up Time:     Juan Quinteros  Neurosurgery  [address], [address]  [place] [address]  Phone: [phone]  Fax: [phone]  Follow Up Time:     Yong Barrow  Plastic Surgery  [address] [place] [id number]  Phone: [phone]  Fax: [phone]  Follow Up Time:

## 2025-06-16 NOTE — PROGRESS NOTE ADULT - ASSESSMENT
ASSESSMENT: ~39y[facility] patient was combative, altered mental status, and not protecting his airway due to large volume hemorrhage requiring field RSI    PLAN:    Neuro:   #Subdural Hematoma  #Subarachnoid Hematoma  - Multimodal pain regimen w/ tylenol and oxycodone and dilaudid prn  - Continue Melatonin 5mg QHS  - Continue Neurological checks Q4  - Delirium precautions  - Optimize sleep/wake cycle     CV:   - Maintain MAP > 65  - Continue hemodynamic monitoring    Pulm:   - Maintain sPO2 >92%  - Pulmonary toilet, IS, OOBTC    GI/Nutrition:   - Continue Soft and Bite Sized Diet  - Protonix ppx  - Bowel regimen  - Monitor bowel movements    /Renal:   - Monitor kidney fxn  - Monitor UOP  - Replete electrolytes PRN (Mg >2, Phos >3, K >4)  - Voiding spontaneously    ID:  - Monitor fever curve  - Monitor for leukocytosis  - s/p 1 dose ancef for facial lacerations     Endo:  - Monitor blood glucose  - Maintain euglycemia, 140-180    Heme/DVT Prophylaxis:  - SCDs  - Monitor H/H  - Chemical prophylaxis held given septal hematoma    Skin:  - Repositioning for DTI prevention while in bed  - L eyebrow laceration sutured close 6/14    ENT:  #Septal Hematoma  #Nasal Bone Fractures   - Awaiting ENT recommendations  - Continue Afrin 1 spray BID x 2 days - d/c after 2 days to avoid rhinitis medicamentosa   - Continue flonase 1 spray BID  - Continue Sinus precautions given b/l nasal bone fx  - Appreciate ENT recs    Lines:  - Peripheral IV's    Dispo:  - Care per primary team - stable from an ICU perspective for downgrade to trauma/acs service  - PT recs  - f/u ENT consult  - consider face/plastics consult if clinically necessary for lip laceration  - f/u GHB + serum drug screen - patient endorsed post extubation to taking GHB as likely reason for his initial altered mental status     Tertiary [X] - 6/15  PTSD [X] - placed 6/15.  
35 yo male found face down with nasal bone fracture. Stable SAH/SDH w plans for DC home

## 2025-06-16 NOTE — OCCUPATIONAL THERAPY INITIAL EVALUATION ADULT - PERTINENT HX OF CURRENT PROBLEM, REHAB EVAL
no As per MD note: 39 year old male who was found down on [facility] and was admitted to [facility] on 6/14/25 after a fall where he sustained a traumatic subdural hematoma and traumatic subarachnoid hematoma, comminuted nasal bone fracture and a septal hematoma, soft tissue avulsion of lip, L eyebrow laceration and mesenteric haziness on CT scan.  Unresponsive initially requiring intubation.

## 2025-06-16 NOTE — EEG REPORT - NS EEG TEXT BOX
DON BENJAMIN [id number]     Study Date: 06-15-25 08:00 to 06-15-25 08:38  Duration: 37 min    --------------------------------------------------------------------------------------------------  History:  CC/ HPI Patient is a 39y old  Male who presents with a chief complaint of SAH / SDH (15 Gopi 2025 10:48)    MEDICATIONS  (STANDING):  bacitracin   Ointment 1 Application(s) Topical daily  chlorhexidine 2% Cloths 1 Application(s) Topical daily  enoxaparin Injectable 30 milliGRAM(s) SubCutaneous every 12 hours  FIRST- Mouthwash  BLM 10 milliLiter(s) Swish and Spit three times a day  fluticasone propionate 50 MICROgram(s)/spray Nasal Spray 1 Spray(s) Both Nostrils two times a day  melatonin 5 milliGRAM(s) Oral at bedtime  oxymetazoline 0.05% Nasal Spray 1 Spray(s) Both Nostrils two times a day  pantoprazole  Injectable 40 milliGRAM(s) IV Push daily  polyethylene glycol 3350 17 Gram(s) Oral daily  senna 2 Tablet(s) Oral at bedtime    --------------------------------------------------------------------------------------------------  Study Interpretation:    [[[Abbreviation Key:  PDR=alpha rhythm/posterior dominant rhythm. A-P=anterior posterior gradient.  Amplitude: ‘very low’:<20; ‘low’:20-50; ‘medium’:; ‘high’:>200uV.  Persistence for periodic/rhythmic patterns (% of epoch) ‘rare’:<1%; ‘occasional’:1-10%; ‘frequent’:10-50%; ‘abundant’:50-90%; ‘continuous’:>90%.  Persistence for sporadic discharges: ‘rare’:<1/hr; ‘occasional’:1/min-1/hr; ‘frequent’:>1/min; ‘abundant’:>1/10 sec.  GRDA=generalized rhythmic delta activity; FIRDA=frontal intermittent GRDA; LRDA=lateralized rhythmic delta activity; TIRDA=temporal intermittent rhythmic delta activity;  LPD=PLED=lateralized periodic discharges; GPD=generalized periodic discharges; BiPDs=BiPLEDs=bilateral independent periodic epileptiform discharges; SIRPID=stimulus induced rhythmic, periodic, or ictal appearing discharges; BIRDs=brief potentially ictal rhythmic discharges >4 Hz, lasting .5-10s; PFA=paroxysmal bursts of beta/gamma; LVFA=low voltage fast activity.  Modifiers: +F=with fast component; +S=with spike component; +R=with rhythmic component.  S-B=burst suppression pattern.  Max=maximal. N1-drowsy; N2-stage II sleep; N3-slow wave sleep. SSS/BETS=small sharp spikes/benign epileptiform transients of sleep. HV=hyperventilation; PS=photic stimulation]]]    FINDINGS:      Background:  Continuity: Continuous  Symmetry: Symmetric  PDR: 9 Hz activity, with amplitude to 40 uV, that attenuated to eye opening.    Reactivity: Present  Voltage: Normal (between 20-150uV)  Anterior Posterior Gradient: Present  Other background findings: None  Breach: Absent    Background Slowing:  Generalized slowing: None was present.  Focal slowing: None was present.    State Changes:   -Drowsiness noted with increased slowing, attenuation of fast activity, vertex transients.  -N2 sleep transients were not recorded.    Interictal Findings:  None    Electrographic and Electroclinical seizures:  None    Other Clinical Events:  None    Activation Procedures:   -Hyperventilation was not performed.    -Photic stimulation was not performed.     Artifacts:  Intermittent myogenic and movement artifacts were noted.    ECG:  The heart rate on single channel ECG was predominantly between 60 - 80 BPM.    EEG Classification / Summary:  Normal EEG study in the awake / drowsy states    -----------------------------------------------------------------------------------------------------    Clinical Impression:  Normal EEG study  There were no epileptiform abnormalities or seizures recorded.      This is a preliminary impression still pending attendings attestation.     -------------------------------------------------------------------------------------------------------  EEG reading room: [phone]    After-hours epilepsy service: [phone]    Shorty Ravi DO  Epilepsy Fellow DON BENJAMIN [id number]     Study Date: 06-15-25 08:00-08:38  Duration: 37 minutes    --------------------------------------------------------------------------------------------------  History:  CC/ HPI Patient is a 39y old  Male who presents with a chief complaint of SAH / SDH (15 Gopi 2025 10:48)    MEDICATIONS  (STANDING):  bacitracin   Ointment 1 Application(s) Topical daily  chlorhexidine 2% Cloths 1 Application(s) Topical daily  enoxaparin Injectable 30 milliGRAM(s) SubCutaneous every 12 hours  FIRST- Mouthwash  BLM 10 milliLiter(s) Swish and Spit three times a day  fluticasone propionate 50 MICROgram(s)/spray Nasal Spray 1 Spray(s) Both Nostrils two times a day  melatonin 5 milliGRAM(s) Oral at bedtime  oxymetazoline 0.05% Nasal Spray 1 Spray(s) Both Nostrils two times a day  pantoprazole  Injectable 40 milliGRAM(s) IV Push daily  polyethylene glycol 3350 17 Gram(s) Oral daily  senna 2 Tablet(s) Oral at bedtime    --------------------------------------------------------------------------------------------------  Study Interpretation:    [[[Abbreviation Key:  PDR=alpha rhythm/posterior dominant rhythm. A-P=anterior posterior gradient.  Amplitude: ‘very low’:<20; ‘low’:20-50; ‘medium’:; ‘high’:>200uV.  Persistence for periodic/rhythmic patterns (% of epoch) ‘rare’:<1%; ‘occasional’:1-10%; ‘frequent’:10-50%; ‘abundant’:50-90%; ‘continuous’:>90%.  Persistence for sporadic discharges: ‘rare’:<1/hr; ‘occasional’:1/min-1/hr; ‘frequent’:>1/min; ‘abundant’:>1/10 sec.  GRDA=generalized rhythmic delta activity; FIRDA=frontal intermittent GRDA; LRDA=lateralized rhythmic delta activity; TIRDA=temporal intermittent rhythmic delta activity;  LPD=PLED=lateralized periodic discharges; GPD=generalized periodic discharges; BiPDs=BiPLEDs=bilateral independent periodic epileptiform discharges; SIRPID=stimulus induced rhythmic, periodic, or ictal appearing discharges; BIRDs=brief potentially ictal rhythmic discharges >4 Hz, lasting .5-10s; PFA=paroxysmal bursts of beta/gamma; LVFA=low voltage fast activity.  Modifiers: +F=with fast component; +S=with spike component; +R=with rhythmic component.  S-B=burst suppression pattern.  Max=maximal. N1-drowsy; N2-stage II sleep; N3-slow wave sleep. SSS/BETS=small sharp spikes/benign epileptiform transients of sleep. HV=hyperventilation; PS=photic stimulation]]]    FINDINGS:      Background:  Continuity: Continuous  Symmetry: Symmetric  PDR: 9 Hz, reactive to eye closure  Voltage: Normal  Anterior Posterior Gradient: Present, low-amplitude frontal beta  Other background findings: None  Breach: Absent    Background Slowing:  Generalized slowing: None  Focal slowing: None    State Changes:   Drowsiness is characterized by slowing of the background activity.  Stage 2 sleep is not captured.    Interictal Findings:  None    Electrographic and Electroclinical seizures:  None    Other Clinical Events:  None    Activation Procedures:   -Hyperventilation not performed.    -Photic stimulation not performed.     Artifacts:  Intermittent myogenic and movement artifacts    Single-lead EKG: Regular rhythm    EEG Classification / Summary:  Normal EEG in the awake / drowsy states  No focal or epileptiform abnormalities captured.  No electrographic seizures.    Clinical Impression:  Normal EEG.  No epileptiform abnormalities or seizures captured.     -------------------------------------------------------------------------------------------------------  EEG reading room: [phone]  After-hours epilepsy service: [phone]    Shorty Ravi DO  Epilepsy Fellow    Felicity Evans MD  Attending Physician, [facility] [place]

## 2025-06-18 ENCOUNTER — APPOINTMENT (OUTPATIENT)
Dept: OTOLARYNGOLOGY | Facility: CLINIC | Age: 35
End: 2025-06-18
Payer: MEDICAID

## 2025-06-18 VITALS
DIASTOLIC BLOOD PRESSURE: 83 MMHG | HEIGHT: 74 IN | WEIGHT: 190 LBS | HEART RATE: 58 BPM | BODY MASS INDEX: 24.38 KG/M2 | SYSTOLIC BLOOD PRESSURE: 125 MMHG

## 2025-06-18 PROCEDURE — 99204 OFFICE O/P NEW MOD 45 MIN: CPT

## 2025-06-18 RX ORDER — ACETAMINOPHEN 500 MG/5ML
975 LIQUID (ML) ORAL ONCE
Refills: 0 | Status: COMPLETED | OUTPATIENT
Start: 2025-06-23 | End: 2025-06-23

## 2025-06-18 RX ORDER — CEFAZOLIN SODIUM IN 0.9 % NACL 3 G/100 ML
2000 INTRAVENOUS SOLUTION, PIGGYBACK (ML) INTRAVENOUS ONCE
Refills: 0 | Status: DISCONTINUED | OUTPATIENT
Start: 2025-06-23 | End: 2025-06-23

## 2025-06-19 LAB — DRUG SCREEN, SERUM: SIGNIFICANT CHANGE UP

## 2025-06-20 NOTE — ASU PATIENT PROFILE, ADULT - LEARNING ASSESSMENT (PATIENT) ADDITIONAL COMMENTS
Telephone interview with pt: Instructed pt on Pre-Admission instructions/teaching, tips for safer surgery, pain management scale, handwashing protocol.

## 2025-06-23 ENCOUNTER — APPOINTMENT (OUTPATIENT)
Dept: OTOLARYNGOLOGY | Facility: HOSPITAL | Age: 35
End: 2025-06-23

## 2025-06-23 ENCOUNTER — OUTPATIENT (OUTPATIENT)
Dept: OUTPATIENT SERVICES | Facility: HOSPITAL | Age: 35
LOS: 1 days | End: 2025-06-23
Payer: MEDICAID

## 2025-06-23 ENCOUNTER — TRANSCRIPTION ENCOUNTER (OUTPATIENT)
Age: 35
End: 2025-06-23

## 2025-06-23 VITALS
TEMPERATURE: 98 F | OXYGEN SATURATION: 100 % | SYSTOLIC BLOOD PRESSURE: 145 MMHG | HEART RATE: 64 BPM | RESPIRATION RATE: 16 BRPM | DIASTOLIC BLOOD PRESSURE: 72 MMHG

## 2025-06-23 VITALS
SYSTOLIC BLOOD PRESSURE: 140 MMHG | HEART RATE: 70 BPM | HEIGHT: 72 IN | DIASTOLIC BLOOD PRESSURE: 77 MMHG | TEMPERATURE: 98 F | OXYGEN SATURATION: 98 % | RESPIRATION RATE: 17 BRPM | WEIGHT: 180.78 LBS

## 2025-06-23 DIAGNOSIS — S02.2XXA FRACTURE OF NASAL BONES, INITIAL ENCOUNTER FOR CLOSED FRACTURE: ICD-10-CM

## 2025-06-23 LAB
AMPHET UR-MCNC: NEGATIVE — SIGNIFICANT CHANGE UP
APTT BLD: 29.1 SEC — SIGNIFICANT CHANGE UP (ref 26.1–36.8)
BARBITURATES UR SCN-MCNC: NEGATIVE — SIGNIFICANT CHANGE UP
BENZODIAZ UR-MCNC: POSITIVE
BLD GP AB SCN SERPL QL: SIGNIFICANT CHANGE UP
COCAINE METAB.OTHER UR-MCNC: NEGATIVE — SIGNIFICANT CHANGE UP
FENTANYL UR QL SCN: NEGATIVE — SIGNIFICANT CHANGE UP
G-OH-BUTYR BLD-MCNC: NEGATIVE — SIGNIFICANT CHANGE UP
INR BLD: 0.97 RATIO — SIGNIFICANT CHANGE UP (ref 0.85–1.16)
METHADONE UR-MCNC: NEGATIVE — SIGNIFICANT CHANGE UP
OPIATES UR-MCNC: NEGATIVE — SIGNIFICANT CHANGE UP
PCP SPEC-MCNC: SIGNIFICANT CHANGE UP
PCP UR-MCNC: NEGATIVE — SIGNIFICANT CHANGE UP
PROTHROM AB SERPL-ACNC: 11.3 SEC — SIGNIFICANT CHANGE UP (ref 9.9–13.4)
THC UR QL: POSITIVE

## 2025-06-23 PROCEDURE — 21320 CLSD TX NSL FX W/MNPJ&STABLJ: CPT | Mod: 59

## 2025-06-23 PROCEDURE — 21337 CLOSED TX SEPTAL&NOSE FX: CPT

## 2025-06-23 PROCEDURE — ZZZZZ: CPT

## 2025-06-23 PROCEDURE — 86901 BLOOD TYPING SEROLOGIC RH(D): CPT

## 2025-06-23 PROCEDURE — 80307 DRUG TEST PRSMV CHEM ANLYZR: CPT

## 2025-06-23 PROCEDURE — 85730 THROMBOPLASTIN TIME PARTIAL: CPT

## 2025-06-23 PROCEDURE — 86900 BLOOD TYPING SEROLOGIC ABO: CPT

## 2025-06-23 PROCEDURE — 86850 RBC ANTIBODY SCREEN: CPT

## 2025-06-23 PROCEDURE — 85610 PROTHROMBIN TIME: CPT

## 2025-06-23 PROCEDURE — C9399: CPT

## 2025-06-23 PROCEDURE — 21320 CLSD TX NSL FX W/MNPJ&STABLJ: CPT

## 2025-06-23 PROCEDURE — 36415 COLL VENOUS BLD VENIPUNCTURE: CPT

## 2025-06-23 RX ORDER — ONDANSETRON HCL/PF 4 MG/2 ML
4 VIAL (ML) INJECTION ONCE
Refills: 0 | Status: DISCONTINUED | OUTPATIENT
Start: 2025-06-23 | End: 2025-06-23

## 2025-06-23 RX ORDER — FENTANYL CITRATE-0.9 % NACL/PF 100MCG/2ML
25 SYRINGE (ML) INTRAVENOUS
Refills: 0 | Status: DISCONTINUED | OUTPATIENT
Start: 2025-06-23 | End: 2025-06-23

## 2025-06-23 RX ORDER — HYDROMORPHONE/SOD CHLOR,ISO/PF 2 MG/10 ML
0.5 SYRINGE (ML) INJECTION
Refills: 0 | Status: DISCONTINUED | OUTPATIENT
Start: 2025-06-23 | End: 2025-06-23

## 2025-06-23 RX ORDER — ACETAMINOPHEN 500 MG/5ML
2 LIQUID (ML) ORAL
Qty: 0 | Refills: 0 | DISCHARGE

## 2025-06-23 RX ADMIN — Medication 0.5 MILLIGRAM(S): at 12:30

## 2025-06-23 RX ADMIN — Medication 0.5 MILLIGRAM(S): at 12:18

## 2025-06-23 RX ADMIN — Medication 0.5 MILLIGRAM(S): at 12:00

## 2025-06-23 RX ADMIN — Medication 25 MICROGRAM(S): at 12:46

## 2025-06-23 RX ADMIN — Medication 10 MILLIGRAM(S): at 11:55

## 2025-06-23 RX ADMIN — Medication 975 MILLIGRAM(S): at 09:46

## 2025-06-23 NOTE — BRIEF OPERATIVE NOTE - OPERATION/FINDINGS
Severely comminuted fracture of bilateral nasal bones and septum.  Required internal stabilization with [place] II splints and Aquaplast externally.

## 2025-06-23 NOTE — ASU DISCHARGE PLAN (ADULT/PEDIATRIC) - FINANCIAL ASSISTANCE
[facility] provides services at a reduced cost to those who are determined to be eligible through [facility]’s financial assistance program. Information regarding [facility]’s financial assistance program can be found by going to [email or web address] or by calling [phone].

## 2025-06-23 NOTE — BRIEF OPERATIVE NOTE - NSICDXBRIEFPROCEDURE_GEN_ALL_CORE_FT
PROCEDURES:  Closed reduction, nasal septum 23-Jun-2025 10:56:09  Oc Obregon  Closed reduction, fracture, nasal bone, with stabilization 23-Jun-2025 10:56:45  Oc Obregon

## 2025-06-23 NOTE — ASU DISCHARGE PLAN (ADULT/PEDIATRIC) - CARE PROVIDER_API CALL
Oc Obregon  Otolaryngology Head And Neck Surgery  [address], [place], [address]  [place] [address]  Phone: [phone]  Fax: [phone]  Established Patient  Scheduled Appointment: 07/02/2025 09:15 AM

## 2025-06-23 NOTE — ASU DISCHARGE PLAN (ADULT/PEDIATRIC) - ASU DC SPECIAL INSTRUCTIONSFT
Please leave casting and strips intact which will be removed at your post op visit. Do not blow your nose, bend below the waist or participate in heavy lifting. Sneeze with mouth open. Do not manipulate your nose while healing.

## 2025-06-23 NOTE — BRIEF OPERATIVE NOTE - NSICDXBRIEFPOSTOP_GEN_ALL_CORE_FT
POST-OP DIAGNOSIS:  Nasal bone fracture 23-Jun-2025 10:57:27  Oc Obregon  Nasal septum fracture 23-Jun-2025 10:57:37  Oc Obregon

## 2025-06-23 NOTE — BRIEF OPERATIVE NOTE - NSICDXBRIEFPREOP_GEN_ALL_CORE_FT
PRE-OP DIAGNOSIS:  Nasal bone fracture 23-Jun-2025 10:56:57  Oc Obregon  Nasal septum fracture 23-Jun-2025 10:57:15  Oc Obregon

## 2025-06-23 NOTE — ASU DISCHARGE PLAN (ADULT/PEDIATRIC) - NS MD DC FALL RISK RISK
For information on Fall & Injury Prevention, visit: [email or web address] OR  [email or web address] OR  https://www.cdc.gov/steadi/patient.html

## 2025-06-26 ENCOUNTER — APPOINTMENT (OUTPATIENT)
Dept: OTOLARYNGOLOGY | Facility: CLINIC | Age: 35
End: 2025-06-26

## 2025-06-26 ENCOUNTER — APPOINTMENT (OUTPATIENT)
Dept: OTOLARYNGOLOGY | Facility: HOSPITAL | Age: 35
End: 2025-06-26

## 2025-06-26 VITALS
DIASTOLIC BLOOD PRESSURE: 65 MMHG | WEIGHT: 190 LBS | HEART RATE: 62 BPM | BODY MASS INDEX: 24.38 KG/M2 | HEIGHT: 74 IN | SYSTOLIC BLOOD PRESSURE: 113 MMHG

## 2025-06-26 PROCEDURE — 99203 OFFICE O/P NEW LOW 30 MIN: CPT | Mod: 25

## 2025-06-26 PROCEDURE — 21337 CLOSED TX SEPTAL&NOSE FX: CPT

## 2025-07-02 ENCOUNTER — APPOINTMENT (OUTPATIENT)
Dept: OTOLARYNGOLOGY | Facility: CLINIC | Age: 35
End: 2025-07-02
Payer: MEDICAID

## 2025-07-02 VITALS
SYSTOLIC BLOOD PRESSURE: 120 MMHG | HEART RATE: 70 BPM | BODY MASS INDEX: 25.73 KG/M2 | HEIGHT: 72 IN | WEIGHT: 190 LBS | DIASTOLIC BLOOD PRESSURE: 70 MMHG

## 2025-07-02 PROCEDURE — 99024 POSTOP FOLLOW-UP VISIT: CPT

## 2025-07-07 ENCOUNTER — NON-APPOINTMENT (OUTPATIENT)
Age: 35
End: 2025-07-07

## 2025-07-16 ENCOUNTER — APPOINTMENT (OUTPATIENT)
Dept: OTOLARYNGOLOGY | Facility: CLINIC | Age: 35
End: 2025-07-16
Payer: MEDICAID

## 2025-07-16 VITALS
BODY MASS INDEX: 25.73 KG/M2 | DIASTOLIC BLOOD PRESSURE: 79 MMHG | HEIGHT: 72 IN | HEART RATE: 73 BPM | SYSTOLIC BLOOD PRESSURE: 119 MMHG | WEIGHT: 190 LBS

## 2025-07-16 PROCEDURE — 99024 POSTOP FOLLOW-UP VISIT: CPT

## (undated) DEVICE — Device

## (undated) DEVICE — GLV 7.5 PROTEXIS (WHITE)

## (undated) DEVICE — SOL IRR POUR H2O 1000ML

## (undated) DEVICE — DRSG STERISTRIPS 0.5 X 4"

## (undated) DEVICE — WARMING BLANKET LOWER ADULT

## (undated) DEVICE — FRAZIER SUCTION TIP 10FR

## (undated) DEVICE — GLV 8 PROTEXIS (WHITE)

## (undated) DEVICE — SOL IRR POUR NS 0.9% 1000ML

## (undated) DEVICE — DRAPE SPLIT SHEET 77" X 108"

## (undated) DEVICE — DRSG NASOPORE 8CM FIRM

## (undated) DEVICE — SYR LUER LOK 10CC

## (undated) DEVICE — VENODYNE/SCD SLEEVE CALF MEDIUM

## (undated) DEVICE — POSITIONER FOAM EGG CRATE ULNAR 2PCS (PINK)